# Patient Record
Sex: FEMALE | Race: WHITE | NOT HISPANIC OR LATINO | URBAN - METROPOLITAN AREA
[De-identification: names, ages, dates, MRNs, and addresses within clinical notes are randomized per-mention and may not be internally consistent; named-entity substitution may affect disease eponyms.]

---

## 2025-04-16 ASSESSMENT — DUKE ACTIVITY SCORE INDEX (DASI)
TOTAL_SCORE: 32.2
CAN YOU WALK A BLOCK OR TWO ON LEVEL GROUND: YES
CAN YOU TAKE CARE OF YOURSELF (EAT, DRESS, BATHE, OR USE TOILET): YES
CAN YOU DO HEAVY WORK AROUND THE HOUSE LIKE SCRUBBING FLOORS OR LIFTING AND MOVING HEAVY FURNITURE: YES
CAN YOU DO YARD WORK LIKE RAKING LEAVES, WEEDING OR PUSHING A MOWER: NO
CAN YOU DO LIGHT WORK AROUND THE HOUSE LIKE DUSTING OR WASHING DISHES: YES
CAN YOU WALK INDOORS, SUCH AS AROUND YOUR HOUSE: YES
CAN YOU DO MODERATE WORK AROUND THE HOUSE LIKE VACUUMING, SWEEPING FLOORS OR CARRYING GROCERIES: YES
CAN YOU CLIMB A FLIGHT OF STAIRS OR WALK UP A HILL: YES
DASI METS SCORE: 6.7
CAN YOU RUN A SHORT DISTANCE: NO
CAN YOU PARTICIPATE IN STRENOUS SPORTS LIKE SWIMMING, SINGLES TENNIS, FOOTBALL, BASKETBALL, OR SKIING: NO
CAN YOU PARTICIPATE IN MODERATE RECREATIONAL ACTIVITIES LIKE GOLF, BOWLING, DANCING, DOUBLES TENNIS OR THROWING A BASEBALL OR FOOTBALL: NO
CAN YOU HAVE SEXUAL RELATIONS: YES

## 2025-04-16 ASSESSMENT — LIFESTYLE VARIABLES: SMOKING_STATUS: NONSMOKER

## 2025-04-16 ASSESSMENT — ACTIVITIES OF DAILY LIVING (ADL): ADL_SCORE: 0

## 2025-04-17 ENCOUNTER — PRE-ADMISSION TESTING (OUTPATIENT)
Dept: PREADMISSION TESTING | Facility: HOSPITAL | Age: 70
End: 2025-04-17
Payer: MEDICARE

## 2025-04-17 ENCOUNTER — LAB (OUTPATIENT)
Dept: LAB | Facility: HOSPITAL | Age: 70
End: 2025-04-17
Payer: MEDICARE

## 2025-04-17 VITALS
BODY MASS INDEX: 30.47 KG/M2 | WEIGHT: 189.6 LBS | SYSTOLIC BLOOD PRESSURE: 146 MMHG | DIASTOLIC BLOOD PRESSURE: 82 MMHG | HEART RATE: 85 BPM | TEMPERATURE: 96.6 F | RESPIRATION RATE: 16 BRPM | HEIGHT: 66 IN | OXYGEN SATURATION: 99 %

## 2025-04-17 DIAGNOSIS — R79.9 ABNORMAL FINDING OF BLOOD CHEMISTRY, UNSPECIFIED: ICD-10-CM

## 2025-04-17 DIAGNOSIS — R79.9 ABNORMAL FINDING OF BLOOD CHEMISTRY, UNSPECIFIED: Primary | ICD-10-CM

## 2025-04-17 DIAGNOSIS — Z01.818 PRE-OP TESTING: ICD-10-CM

## 2025-04-17 DIAGNOSIS — Z01.818 PREOP EXAMINATION: Primary | ICD-10-CM

## 2025-04-17 LAB
ABO GROUP (TYPE) IN BLOOD: NORMAL
ANION GAP SERPL CALC-SCNC: 13 MMOL/L (ref 10–20)
ANTIBODY SCREEN: NORMAL
ATRIAL RATE: 76 BPM
BUN SERPL-MCNC: 17 MG/DL (ref 6–23)
CALCIUM SERPL-MCNC: 9.7 MG/DL (ref 8.6–10.3)
CHLORIDE SERPL-SCNC: 106 MMOL/L (ref 98–107)
CO2 SERPL-SCNC: 27 MMOL/L (ref 21–32)
CREAT SERPL-MCNC: 1.16 MG/DL (ref 0.5–1.05)
EGFRCR SERPLBLD CKD-EPI 2021: 51 ML/MIN/1.73M*2
ERYTHROCYTE [DISTWIDTH] IN BLOOD BY AUTOMATED COUNT: 12.8 % (ref 11.5–14.5)
GLUCOSE SERPL-MCNC: 99 MG/DL (ref 74–99)
HCT VFR BLD AUTO: 40.5 % (ref 36–46)
HGB BLD-MCNC: 13.3 G/DL (ref 12–16)
MCH RBC QN AUTO: 31.8 PG (ref 26–34)
MCHC RBC AUTO-ENTMCNC: 32.8 G/DL (ref 32–36)
MCV RBC AUTO: 97 FL (ref 80–100)
NRBC BLD-RTO: 0 /100 WBCS (ref 0–0)
P AXIS: 63 DEGREES
P OFFSET: 190 MS
P ONSET: 139 MS
PLATELET # BLD AUTO: 225 X10*3/UL (ref 150–450)
POTASSIUM SERPL-SCNC: 4.5 MMOL/L (ref 3.5–5.3)
PR INTERVAL: 146 MS
Q ONSET: 212 MS
QRS COUNT: 13 BEATS
QRS DURATION: 84 MS
QT INTERVAL: 392 MS
QTC CALCULATION(BAZETT): 441 MS
QTC FREDERICIA: 424 MS
R AXIS: 49 DEGREES
RBC # BLD AUTO: 4.18 X10*6/UL (ref 4–5.2)
RH FACTOR (ANTIGEN D): NORMAL
SODIUM SERPL-SCNC: 141 MMOL/L (ref 136–145)
T AXIS: 51 DEGREES
T OFFSET: 408 MS
VENTRICULAR RATE: 76 BPM
WBC # BLD AUTO: 5.3 X10*3/UL (ref 4.4–11.3)

## 2025-04-17 PROCEDURE — 83036 HEMOGLOBIN GLYCOSYLATED A1C: CPT

## 2025-04-17 PROCEDURE — 80048 BASIC METABOLIC PNL TOTAL CA: CPT

## 2025-04-17 PROCEDURE — 85027 COMPLETE CBC AUTOMATED: CPT

## 2025-04-17 PROCEDURE — 86901 BLOOD TYPING SEROLOGIC RH(D): CPT

## 2025-04-17 PROCEDURE — 86900 BLOOD TYPING SEROLOGIC ABO: CPT

## 2025-04-17 PROCEDURE — 99202 OFFICE O/P NEW SF 15 MIN: CPT | Performed by: NURSE PRACTITIONER

## 2025-04-17 PROCEDURE — 86850 RBC ANTIBODY SCREEN: CPT

## 2025-04-17 PROCEDURE — 93005 ELECTROCARDIOGRAM TRACING: CPT

## 2025-04-17 PROCEDURE — 87081 CULTURE SCREEN ONLY: CPT | Mod: STJLAB

## 2025-04-17 RX ORDER — LISINOPRIL 10 MG/1
10 TABLET ORAL DAILY
COMMUNITY

## 2025-04-17 RX ORDER — LANOLIN ALCOHOL/MO/W.PET/CERES
1000 CREAM (GRAM) TOPICAL
COMMUNITY

## 2025-04-17 RX ORDER — LEVOTHYROXINE SODIUM 50 UG/1
50 TABLET ORAL DAILY
COMMUNITY

## 2025-04-17 RX ORDER — CHLORHEXIDINE GLUCONATE ORAL RINSE 1.2 MG/ML
SOLUTION DENTAL
Qty: 473 ML | Refills: 0 | Status: SHIPPED | OUTPATIENT
Start: 2025-04-17

## 2025-04-17 RX ORDER — FERROUS SULFATE 325(65) MG
325 TABLET, DELAYED RELEASE (ENTERIC COATED) ORAL 2 TIMES WEEKLY
COMMUNITY

## 2025-04-17 ASSESSMENT — PAIN - FUNCTIONAL ASSESSMENT: PAIN_FUNCTIONAL_ASSESSMENT: 0-10

## 2025-04-17 NOTE — PREPROCEDURE INSTRUCTIONS
Thank you for visiting [facility] at [facility]. If you have any changes to your health condition, please call the SURGEON's office to alert them and give them details of your symptoms.        Preoperative Brain Exercises    What are brain exercises?  A brain exercise is any activity that engages your thinking (cognitive) skills.    What types of activities are considered brain exercises?  Jigsaw puzzles, crossword puzzles, word jumble, memory games, word search, and many more.  Many can be found free online or on your phone via a mobile bryce.    Why should I do brain exercises before my surgery?  More recent research has shown brain exercise before surgery can lower the risk of postoperative delirium (confusion) which can be especially important for older adults.  Patients who did brain exercises for 5 to 10 hours the days before surgery, cut their risk of postoperative delirium in half up to 1 week after surgery.      Preoperative Deep Breathing Exercises    Why it is important to do deep breathing exercises before my surgery?  Deep breathing exercises strengthen your breathing muscles.  This helps you to recover after your surgery and decreases the chance of breathing complications.    How are the deep breathing exercises done?  Sit straight with your back supported.  Breathe in deeply and slowly through your nose. Your lower rib cage should expand and your abdomen may move forward.  Hold that breath for 3 to 5 seconds.  Breathe out through pursed lips, slowly and completely.  Rest and repeat 10 times every hour while awake.  Rest longer if you become dizzy or lightheaded.      Patient and Family Education   Ways You Can Help Prevent Blood Clots     This handout explains some simple things you can do to help prevent blood clots.      Blood clots are blockages that can form in the body's veins. When a blood clot forms in your deep veins, it may be called a deep vein thrombosis, or DVT for short. Blood clots can  happen in any part of the body where blood flows, but they are most common in the arms and legs. If a piece of a blood clot breaks free and travels to the lungs, it is called a pulmonary embolus (PE). A PE can be a very serious problem.      Being in the hospital or having surgery can raise your chances of getting a blood clot because you may not be well enough to move around as much as you normally do.      Ways you can help prevent blood clots in the hospital         Wearing SCDs. SCDs stands for Sequential Compression Devices.   SCDs are special sleeves that wrap around your legs  They attach to a pump that fills them with air to gently squeeze your legs every few minutes.   This helps return the blood in your legs to your heart.   SCDs should only be taken off when walking or bathing.   SCDs may not be comfortable, but they can help save your life.               Wearing compression stockings - if your doctor orders them. These special snug fitting stockings gently squeeze your legs to help blood flow.       Walking. Walking helps move the blood in your legs.   If your doctor says it is ok, try walking the halls at least   5 times a day. Ask us to help you get up, so you don't fall.      Taking any blood thinning medicines your doctor orders.          ©[facility]; 3/23        Ways you can help prevent blood clots at home       Wearing compression stockings - if your doctor orders them. ? Walking - to help move the blood in your legs.       Taking any blood thinning medicines your doctor orders.      Signs of a blood clot or PE      Tell your doctor or nurse know right away if you have of the problems listed below.    If you are at home, seek medical care right away. Call 911 for chest pain or problems breathing.          Signs of a blood clot (DVT) - such as pain,  swelling, redness or warmth in your arm or leg      Signs of a pulmonary embolism (PE) - such as chest     pain or feeling short of breath  no

## 2025-04-17 NOTE — PREPROCEDURE INSTRUCTIONS
Medication List            Accurate as of April 17, 2025  1:30 PM. Always use your most recent med list.                chlorhexidine 0.12 % solution  Commonly known as: Peridex  Swish and spit 15 ml for 2 doses, 15mL the night before surgery and 15 ml morning of surgery - swish for 30 seconds -DO NOT SWALLOW, SPIT OUT  Medication Adjustments for Surgery: Take/Use as prescribed     cyanocobalamin 1,000 mcg tablet  Commonly known as: Vitamin B-12  Additional Medication Adjustments for Surgery: Take last dose 7 days before surgery  Notes to patient: STOP all NSAIDS (Ibuprofen, Motrin, Aleve), vitamins, herbals, supplements, and all over the counter medications for 7 days prior to surgery    Tylenol (Acetaminophen) is okay to take up until the morning of surgery for pain or headache as needed      ferrous sulfate 325 (65 Fe) mg EC tablet  Medication Adjustments for Surgery: Do Not take on the morning of surgery     levothyroxine 50 mcg tablet  Commonly known as: Synthroid, Levoxyl  Medication Adjustments for Surgery: Take/Use as prescribed     lisinopril 10 mg tablet  Additional Medication Adjustments for Surgery: Other (Comment)  Notes to patient: HOLD any evening dose the night before the day of surgery  HOLD the day of surgery    STOP all NSAIDS (Ibuprofen, Motrin, Aleve), vitamins, herbals, supplements, and all over the counter medications for 7 days prior to surgery    Tylenol (Acetaminophen) is okay to take up until the morning of surgery for pain or headache as needed                               PRE-OPERATIVE INSTRUCTIONS    You will receive notification one business day prior to your procedure to confirm your arrival time. It is important that you answer your phone and/or check your messages during this time. If you do not hear from the surgery center by 5 pm. the day before your procedure, please call [phone].     Please enter the building through the Outpatient entrance and take the elevator off the  lobby to the 2nd floor then check in at the Outpatient Surgery desk on the 2nd floor.    INSTRUCTIONS:  Talk to your surgeon for instructions if you should stop your aspirin, blood thinner, or diabetes medicines.  DO NOT take any multivitamins or over the counter supplements for 7-10 days before surgery.  If not being admitted, you must have an adult immediately available to drive you home after surgery. We also highly recommend you have someone stay with you for the entire day and night of your surgery.  For children having surgery, a parent or legal guardian must accompany them to the surgery center. If this is not possible, please call [phone] to make additional arrangements.  For adults who are unable to consent or make medical decisions for themselves, a legal guardian or Power of  must accompany them to the surgery center. If this is not possible, please call [phone] to make additional arrangements.  Wear comfortable, loose fitting clothing.  All jewelry and piercings must be removed. If you are unable to remove an item or have a dermal piercing, please be sure to tell the nurse when you arrive for surgery.  Nail polish and make-up must be removed.  Avoid smoking or consuming alcohol for 24 hours before surgery.  To help prevent infection, please take a shower/bath and wash your hair the night before and/or morning of surgery (or follow other specific bathing instructions provided).    Preoperative Fasting Guidelines    Why must I stop eating and drinking near surgery time?  With sedation, food or liquid in your stomach can enter your lungs causing serious complications  Increases nausea and vomiting    When do I need to stop eating and drinking before my surgery?  Do not eat any solid food after midnight the night before your surgery/procedure unless otherwise instructed by your surgeon.   If you take a GLP-1 medication (ex: Trulicity, Ozempic, Mounjaro, Zepbound, Bydyreon, Tanzeun, Saxenda,  Victoza, Adlyxin, Rybelus) please follow other specific instructions provided regarding preoperative          fasting.  You may have up to 13.5 ounces of clear liquid until TWO hours before your instructed arrival time to the hospital.  This includes water, black tea/coffee, (no milk or cream) apple juice, and electrolyte drinks (Gatorade).   You may chew gum until TWO hours before your surgery/procedure         If applicable, notify your surgeons office immediately of any new skin changes that occur to the surgical limb.      If you have any questions or concerns, please call Pre-Admission Testing at [phone].     Home Preoperative Antibacterial Shower with Chlorhexidine gluconate (CHG)     What is a home preoperative antibacterial shower?  This shower is a way of cleaning the skin with a germ killing solution before surgery. The solution contains chlorhexidine gluconate, commonly known as CHG. CHG is a skin cleanser with germ killing ability. Let your doctor know if you are allergic to chlorhexidine.    Why do I need to take a preoperative antibacterial shower?  Skin is not sterile. It is best to try to make your skin as free of germs as possible before surgery. Proper cleansing with a germ killing soap before surgery can lower the number of germs on your skin. This helps to reduce the risk of infection at the surgical site. Following the instructions listed below will help you prepare your skin for surgery.    How do I use the solution?  Begin using your CHG soap the night before and again the morning of your procedure.   Do not shave the day before or day of surgery.  Remove all jewelry until after surgery. Take off rings and take out all body-piercing jewelry.  Wash your face and hair with normal soap and shampoo before you use the CHG soap.  Apply the CHG solution to a clean wet washcloth. Move away from the water to avoid premature rinsing of the CHG soap as you are applying. Firmly lather your entire  body from the neck down. Do not use CHG on your face, eyes, ears, or genitals.   Pay special attention to the area where your incisions will be located.  Do not scrub your skin too hard.  It is important to allow the CHG soap to sit on your skin for 3-5 minutes.  Rinse the solution off your body completely. Do not wash with your normal soap after the CHG soap solution.  Pat yourself dry with a clean, soft towel.  Do not apply powders, lotions or deodorants as these might block how the CHG soap works.   Dress in clean clothing.  Be sure to sleep with clean, freshly laundered sheets.  Be aware that CHG can cause stains on fabric. Rinse your washcloth and other linens that have contact with CHG completely. Use only non-chlorine detergents to launder the items used.

## 2025-04-17 NOTE — PREPROCEDURE INSTRUCTIONS
Medication List            Accurate as of April 17, 2025  1:42 PM. Always use your most recent med list.                chlorhexidine 0.12 % solution  Commonly known as: Peridex  Swish and spit 15 ml for 2 doses, 15mL the night before surgery and 15 ml morning of surgery - swish for 30 seconds -DO NOT SWALLOW, SPIT OUT  Medication Adjustments for Surgery: Take/Use as prescribed     cyanocobalamin 1,000 mcg tablet  Commonly known as: Vitamin B-12  Additional Medication Adjustments for Surgery: Take last dose 7 days before surgery  Notes to patient: STOP all NSAIDS (Ibuprofen, Motrin, Aleve), vitamins, herbals, supplements, and all over the counter medications for 7 days prior to surgery    Tylenol (Acetaminophen) is okay to take up until the morning of surgery for pain or headache as needed      ferrous sulfate 325 (65 Fe) mg EC tablet  Medication Adjustments for Surgery: Do Not take on the morning of surgery     levothyroxine 50 mcg tablet  Commonly known as: Synthroid, Levoxyl  Medication Adjustments for Surgery: Take/Use as prescribed     lisinopril 10 mg tablet  Additional Medication Adjustments for Surgery: Other (Comment)  Notes to patient: HOLD any evening dose the night before the day of surgery  HOLD the day of surgery    STOP all NSAIDS (Ibuprofen, Motrin, Aleve), vitamins, herbals, supplements, and all over the counter medications for 7 days prior to surgery    Tylenol (Acetaminophen) is okay to take up until the morning of surgery for pain or headache as needed                               PRE-OPERATIVE INSTRUCTIONS    You will receive notification one business day prior to your procedure to confirm your arrival time. It is important that you answer your phone and/or check your messages during this time. If you do not hear from the surgery center by 5 pm. the day before your procedure, please call [phone].     Please enter the building through the Outpatient entrance and take the elevator off the  lobby to the 2nd floor then check in at the Outpatient Surgery desk on the 2nd floor.    INSTRUCTIONS:  Talk to your surgeon for instructions if you should stop your aspirin, blood thinner, or diabetes medicines.  DO NOT take any multivitamins or over the counter supplements for 7-10 days before surgery.  If not being admitted, you must have an adult immediately available to drive you home after surgery. We also highly recommend you have someone stay with you for the entire day and night of your surgery.  For children having surgery, a parent or legal guardian must accompany them to the surgery center. If this is not possible, please call [phone] to make additional arrangements.  For adults who are unable to consent or make medical decisions for themselves, a legal guardian or Power of  must accompany them to the surgery center. If this is not possible, please call [phone] to make additional arrangements.  Wear comfortable, loose fitting clothing.  All jewelry and piercings must be removed. If you are unable to remove an item or have a dermal piercing, please be sure to tell the nurse when you arrive for surgery.  Nail polish and make-up must be removed.  Avoid smoking or consuming alcohol for 24 hours before surgery.  To help prevent infection, please take a shower/bath and wash your hair the night before and/or morning of surgery (or follow other specific bathing instructions provided).    Preoperative Fasting Guidelines    Why must I stop eating and drinking near surgery time?  With sedation, food or liquid in your stomach can enter your lungs causing serious complications  Increases nausea and vomiting    When do I need to stop eating and drinking before my surgery?  Do not eat any solid food after midnight the night before your surgery/procedure unless otherwise instructed by your surgeon.   If you take a GLP-1 medication (ex: Trulicity, Ozempic, Mounjaro, Zepbound, Bydyreon, Tanzeun, Saxenda,  Victoza, Adlyxin, Jostin) please follow other specific instructions provided regarding preoperative          fasting.  You may have up to 13.5 ounces of clear liquid until TWO hours before your instructed arrival time to the hospital.  This includes water, black tea/coffee, (no milk or cream) apple juice, and electrolyte drinks (Gatorade).   You may chew gum until TWO hours before your surgery/procedure         If applicable, notify your surgeons office immediately of any new skin changes that occur to the surgical limb.      If you have any questions or concerns, please call Pre-Admission Testing at [phone].

## 2025-04-17 NOTE — CPM/PAT H&P
[facility]/PAT Evaluation       Name: Chely Prasad (Chely Prasad)  /Age: 1955/69 y.o.     In-Person       Chief Complaint: pre op appointment for upcoming left THR surgery    HPI    KS is a 70 yo female who has been having left hip discomforts for over one year now and was following with PCP then referred to rheumatology for management. She has received steroids with some improvement, but continues to have symptoms. She was referred to orthopedics and imaging with MRI showed left hip OA. Treatment options discussed and subsequently she is scheduled for a left total hip replacement surgery. Presents to [facility] today for perioperative risk stratification and optimization.  Skin is intact to surgical limb.  Denies any falls with use of assistive devices.  She does wear left ankle brace due to a previous injury. Otherwise denies any recent illness, fever/chills, chest pains or shortness of breath.     Medical History[1]    Surgical History[2]    Patient  has no history on file for sexual activity.    Family History[3]    Allergies[4]    Prior to Admission medications    Medication Sig Start Date End Date Taking? Authorizing Provider   chlorhexidine (Peridex) 0.12 % solution Swish and spit 15 ml for 2 doses, 15mL the night before surgery and 15 ml morning of surgery - swish for 30 seconds -DO NOT SWALLOW, SPIT OUT 25   ANA Babb-CNP   levothyroxine (Synthroid, Levoxyl) 50 mcg tablet Take 1 tablet (50 mcg) by mouth early in the morning..    Historical Provider, MD   lisinopril 10 mg tablet Take 1 tablet (10 mg) by mouth once daily.    Historical Provider, MD BRYCE DONALD   Constitutional: Negative for fever, chills, or sweats   ENMT: Negative for nasal discharge, congestion, ear pain, mouth pain, throat pain + eyeglasses  Respiratory: Negative for cough, wheezing, shortness of breath   Cardiac: Negative for chest pain, dyspnea on exertion, palpitations   Gastrointestinal: Negative for nausea,  "vomiting, diarrhea, constipation, abdominal pain  Genitourinary: Negative for dysuria, flank pain, frequency, hematuria     Musculoskeletal: Positive for decreased ROM, pain, weakness in left hip    Neurological: Negative for dizziness, confusion, headache  Psychiatric: Negative for mood changes   Skin: Negative for itching, rash, ulcer    Hematologic/Lymph: Negative for bruising, easy bleeding  Allergic/Immunologic: Negative itching, sneezing, swelling      Physical Exam  Constitutional:       Appearance: Normal appearance.   HENT:      Head: Normocephalic.      Mouth/Throat:      Mouth: Mucous membranes are moist.   Eyes:      Extraocular Movements: Extraocular movements intact.   Cardiovascular:      Rate and Rhythm: Normal rate and regular rhythm.   Pulmonary:      Effort: Pulmonary effort is normal.      Breath sounds: Normal breath sounds.   Abdominal:      General: Abdomen is flat.      Palpations: Abdomen is soft.   Musculoskeletal:      Cervical back: Normal range of motion.      Left hip: Decreased range of motion.   Skin:     General: Skin is warm and dry.   Neurological:      General: No focal deficit present.      Mental Status: She is alert.   Psychiatric:         Mood and Affect: Mood normal.        PAT AIRWAY:   Airway:     Mallampati::  II    Neck ROM::  Full  normal      Patient Specialist/PCP: Dr. ROXI Mcfarland  Rheum: Dr. Bardales    Visit Vitals  /82   Pulse 85   Temp 35.9 °C (96.6 °F) (Temporal)   Resp 16   Ht 1.676 m (5' 6\")   Wt 86 kg (189 lb 9.5 oz)   SpO2 99%   BMI 30.60 kg/m²   Smoking Status Never   BSA 2 m²       DASI Risk Score      Flowsheet Row Pre-Admission Testing from 4/17/2025 in [facility]   Can you take care of yourself (eat, dress, bathe, or use toilet)?  2.75 filed at 04/16/2025 1325   Can you walk indoors, such as around your house? 1.75 filed at 04/16/2025 1325   Can you walk a block or two on level ground?  2.75 filed at 04/16/2025 1325   Can you climb a " flight of stairs or walk up a hill? 5.5 filed at 04/16/2025 1325   Can you run a short distance? 0 filed at 04/16/2025 1325   Can you do light work around the house like dusting or washing dishes? 2.7 filed at 04/16/2025 1325   Can you do moderate work around the house like vacuuming, sweeping floors or carrying groceries? 3.5 filed at 04/16/2025 1325   Can you do heavy work around the house like scrubbing floors or lifting and moving heavy furniture?  8 filed at 04/16/2025 1325   Can you do yard work like raking leaves, weeding or pushing a mower? 0 filed at 04/16/2025 1325   Can you have sexual relations? 5.25 filed at 04/16/2025 1325   Can you participate in moderate recreational activities like golf, bowling, dancing, doubles tennis or throwing a baseball or football? 0 filed at 04/16/2025 1325   Can you participate in strenous sports like swimming, singles tennis, football, basketball, or skiing? 0 filed at 04/16/2025 1325   DASI SCORE 32.2 filed at 04/16/2025 1325   METS Score (Will be calculated only when all the questions are answered) 6.7 filed at 04/16/2025 1325          Caprini DVT Assessment      Flowsheet Row Pre-Admission Testing from 4/17/2025 in [facility]   DVT Score (IF A SCORE IS NOT CALCULATING, MUST SELECT A BMI TO COMPLETE) 8 filed at 04/16/2025 1331   Surgical Factors Elective major lower extremity arthroplasty filed at 04/16/2025 1331   BMI (BMI MUST BE CHOSEN) 30 or less filed at 04/16/2025 1331          Modified Frailty Index      Flowsheet Row Pre-Admission Testing from 4/17/2025 in [facility]   Non-independent functional status (problems with dressing, bathing, personal grooming, or cooking) 0 filed at 04/16/2025 1325   History of diabetes mellitus  0 filed at 04/16/2025 1325   History of COPD 0 filed at 04/16/2025 1325   History of CHF No filed at 04/16/2025 1325   History of MI 0 filed at 04/16/2025 1325   History of Percutaneous Coronary Intervention,  Cardiac Surgery, or Angina No filed at 04/16/2025 1325   Hypertension requiring the use of medication  0.0909 filed at 04/16/2025 1325   Peripheral vascular disease 0 filed at 04/16/2025 1325   Impaired sensorium (cognitive impairement or loss, clouding, or delirium) 0 filed at 04/16/2025 1325   TIA or CVA withouy residual deficit 0 filed at 04/16/2025 1325   Cerebrovascular accident with deficit 0 filed at 04/16/2025 1325   Modified Frailty Index Calculator .0909 filed at 04/16/2025 1325          [id number] Stroke Risk Points  Current as of just now        N/A 0 to 9 Points:      Last Change: N/A          The [id number] risk score (Lip KAN, et al. 2009. © 2010 [employer]) quantifies the risk of stroke for a patient with atrial fibrillation. For patients without atrial fibrillation or under the age of 18 this score appears as N/A. Higher score values generally indicate higher risk of stroke.        This score is not applicable to this patient. Components are not calculated.          Revised Cardiac Risk Index      Flowsheet Row Pre-Admission Testing from 4/17/2025 in [facility]   High-Risk Surgery (Intraperitoneal, Intrathoracic,Suprainguinal vascular) 0 filed at 04/16/2025 1325   History of ischemic heart disease (History of MI, History of positive exercuse test, Current chest paint considered due to myocardial ischemia, Use of nitrate therapy, ECG with pathological Q Waves) 0 filed at 04/16/2025 1325   History of congestive heart failure (pulmonary edemia, bilateral rales or S3 gallop, Paroxysmal nocturnal dyspnea, CXR showing pulmonary vascular redistribution) 0 filed at 04/16/2025 1325   History of cerebrovascular disease (Prior TIA or stroke) 0 filed at 04/16/2025 1325   Pre-operative insulin treatment 0 filed at 04/16/2025 1325   Pre-operative creatinine>2 mg/dl 0 filed at 04/16/2025 1325   Revised Cardiac Risk Calculator 0 filed at 04/16/2025 1325          Apfel  Simplified Score      Flowsheet Row Pre-Admission Testing from 2025 in [facility]   Smoking status 1 filed at 2025 1325   History of motion sickness or PONV  0 filed at 2025 1325   Use of postoperative opioids 1 filed at 2025 1325   Gender - Female 1=Yes filed at 2025 1325   Apfel Simplified Score Calculator 3 filed at 2025 1325          Risk Analysis Index Results This Encounter         2025  1325             Do you live in a place other than your own home?: 0    When did you begin living in the place you are currently residing?: Greater than one year ago    Any kidney failure, kidney not working well, or seeing a kidney doctor (nephrologist)? If yes, was this for kidney stones or another problem?: 0 No    Any history of chronic (long-term) congestive heart failure (CHF)?: 0 No    Any shortness of breath when resting?: 0 No    In the past five years, have you been diagnosed with or treated for cancer?: No    During the last 3 months has it become difficult for you to remember things or organize your thoughts?: 0 No    Have you lost weight of 10 pounds or more in the past 3 months without trying?: 0 No    Do you have any loss of appetitie?: 0 No    Getting Around (Mobility): 0 Can get around without help    Eatin Can plan and prepare own meals    Toiletin Can use toilet without any help    Personal Hygiene (Bathing, Hand Washing, Changing Clothes): 0 Can shower or bathe without any help    GOODMAN Cancer History: Patient does not indicate history of cancer    Total Risk Analysis Index Score Without Cancer: 20    Total Risk Analysis Index Score: 20          Stop Bang Score      Flowsheet Row Pre-Admission Testing from 2025 in [facility]   Do you snore loudly? 1 filed at 2025 1324   Do you often feel tired or fatigued after your sleep? 0 filed at 2025 1324   Has anyone ever observed you stop breathing in your sleep? 0 filed at  04/16/2025 1324   Do you have or are you being treated for high blood pressure? 1 filed at 04/16/2025 1324   Recent BMI (Calculated) 31 filed at 04/16/2025 1324   Is BMI greater than 35 kg/m2? 0=No filed at 04/16/2025 1324   Age older than 50 years old? 1=Yes filed at 04/16/2025 1324   Is your neck circumference greater than 17 inches (Male) or 16 inches (Female)? 0 filed at 04/16/2025 1324   Gender - Male 0=No filed at 04/16/2025 1324   STOP-BANG Total Score 3 filed at 04/16/2025 1324          Prodigy: High Risk  Total Score: 8              Prodigy Age Score           ARISCAT Score for Postoperative Pulmonary Complications      Flowsheet Row Pre-Admission Testing from 4/17/2025 in [facility]   Age Calculated Score 3 filed at 04/16/2025 1331   Preoperative SpO2 0 filed at 04/16/2025 1331   Respiratory infection in the last month Either upper or lower (i.e., URI, bronchitis, pneumonia), with fever and antibiotic treatment 0 filed at 04/16/2025 1331   Preoperative anemia (Hgb less than 10 g/dl) 0 filed at 04/16/2025 1331   Surgical incision  0 filed at 04/16/2025 1331   Duration of surgery  0 filed at 04/16/2025 1331   Emergency Procedure  0 filed at 04/16/2025 1331   ARISCAT Total Score  3 filed at 04/16/2025 1331          Kevin Perioperative Risk for Myocardial Infarction or Cardiac Arrest (TEO)      Flowsheet Row Pre-Admission Testing from 4/17/2025 in [facility]   Calculated Age Score 1.38 filed at 04/16/2025 1331   Functional Status  0 filed at 04/16/2025 1331   ASA Class  -5.17 filed at 04/16/2025 1331   Creatinine 0 filed at 04/16/2025 1331   Type of Procedure  0.80 filed at 04/16/2025 1331   TEO Total Score  -8.24 filed at 04/16/2025 1331   TEO % 0.03 filed at 04/16/2025 1331            Assessment and Plan:     Pre-Op  70 yo female scheduled for ARTHROPLASTY, HIP, TOTAL, WITHOUT CEMENT - Left on 4/28/2025 with Dr. Trevino. Blood work and MRSA ordered- oral chlorahexidine  prescribed.  EKG shows  Otherwise no further orders indicated.     Cardiac  Hypertension, taking lisinopril   Hyperlipidemia  DASI Score: 32.2   MET Score: 6.7  RCRI  0 which is 3.9% 30 day risk of MACE (risk for cardiac death, nonfatal myocardial infarction, and nonfactal cardiac arrest)  TEO score which indicates a  0.03 % risk of intraoperative or 30-day postoperative MACE    Pulmonary  No diagnosis or significant findings on chart review or clinical presentation and evaluation.    Preoperative deep breathing educational handout provided to patient.  STOP BANG:   3  points which is a low risk for moderate to severe STEPHY  ARISCAT:  3    points which is a low (1.6%) risk of in-hospital post-op pulmonary complications     Neuro  No neurologic diagnoses, however, the patient is at an increased risk for post operative delirium secondary to age >/= 65, sensory impairment, and type and duration of surgery.  Preoperative brain exercise educational handout provided to patient.    The patient is at an increased risk for perioperative stroke secondary to increased age, HTN, HLD, female sex , and general anesthesia.     Endocrine  Hypothyroidism, taking Synthroid    GI  Apfel: 3 points 61% risk for post operative N/V    /Renal  Chronic kidney disease, stage 3  Counseled on avoiding NSAIDs, adequate hydration    Musculoskeletal   Osteoarthritis, left hip    Hematologic  Iron deficiency anemia, taking oral supplement    due to high Caprini score of 8 patient is at HIGH risk for perioperative DVT, informative education handout provided    Psychiatric    Skin check: Patient was instructed to make surgeon aware of any skin changes/concerns prior to surgery.     Anesthesia:  Patient denies any anesthesia complications.     See risk scores as previously documented.            [1]   Past Medical History:  Diagnosis Date    Arthritis     b/l hips and back    Chronic kidney disease     CKD (chronic kidney disease) 7/2023    Stage 3A     Disease of thyroid gland     Hyperlipidemia     Hypertension     Joint pain     Obesity     Pneumonia    [2]   Past Surgical History:  Procedure Laterality Date     SECTION, LOW TRANSVERSE  1992    MENISCECTOMY  2015   [3]   Family History  Problem Relation Name Age of Onset    Arthritis Mother Afshan Mcclellandkadentenzin     Cancer Father Sheldon Brandt    [4]   Allergies  Allergen Reactions    Ceclor [Cefaclor] Rash

## 2025-04-17 NOTE — H&P (VIEW-ONLY)
[facility]/PAT Evaluation       Name: Chely Prasad (Chely Prasad)  /Age: 1955/69 y.o.     In-Person       Chief Complaint: pre op appointment for upcoming left THR surgery    HPI    KS is a 68 yo female who has been having left hip discomforts for over one year now and was following with PCP then referred to rheumatology for management. She has received steroids with some improvement, but continues to have symptoms. She was referred to orthopedics and imaging with MRI showed left hip OA. Treatment options discussed and subsequently she is scheduled for a left total hip replacement surgery. Presents to [facility] today for perioperative risk stratification and optimization.  Skin is intact to surgical limb.  Denies any falls with use of assistive devices.  She does wear left ankle brace due to a previous injury. Otherwise denies any recent illness, fever/chills, chest pains or shortness of breath.     Medical History[1]    Surgical History[2]    Patient  has no history on file for sexual activity.    Family History[3]    Allergies[4]    Prior to Admission medications    Medication Sig Start Date End Date Taking? Authorizing Provider   chlorhexidine (Peridex) 0.12 % solution Swish and spit 15 ml for 2 doses, 15mL the night before surgery and 15 ml morning of surgery - swish for 30 seconds -DO NOT SWALLOW, SPIT OUT 25   ANA Babb-CNP   levothyroxine (Synthroid, Levoxyl) 50 mcg tablet Take 1 tablet (50 mcg) by mouth early in the morning..    Historical Provider, MD   lisinopril 10 mg tablet Take 1 tablet (10 mg) by mouth once daily.    Historical Provider, MD BRYCE DONALD   Constitutional: Negative for fever, chills, or sweats   ENMT: Negative for nasal discharge, congestion, ear pain, mouth pain, throat pain + eyeglasses  Respiratory: Negative for cough, wheezing, shortness of breath   Cardiac: Negative for chest pain, dyspnea on exertion, palpitations   Gastrointestinal: Negative for nausea,  "vomiting, diarrhea, constipation, abdominal pain  Genitourinary: Negative for dysuria, flank pain, frequency, hematuria     Musculoskeletal: Positive for decreased ROM, pain, weakness in left hip    Neurological: Negative for dizziness, confusion, headache  Psychiatric: Negative for mood changes   Skin: Negative for itching, rash, ulcer    Hematologic/Lymph: Negative for bruising, easy bleeding  Allergic/Immunologic: Negative itching, sneezing, swelling      Physical Exam  Constitutional:       Appearance: Normal appearance.   HENT:      Head: Normocephalic.      Mouth/Throat:      Mouth: Mucous membranes are moist.   Eyes:      Extraocular Movements: Extraocular movements intact.   Cardiovascular:      Rate and Rhythm: Normal rate and regular rhythm.   Pulmonary:      Effort: Pulmonary effort is normal.      Breath sounds: Normal breath sounds.   Abdominal:      General: Abdomen is flat.      Palpations: Abdomen is soft.   Musculoskeletal:      Cervical back: Normal range of motion.      Left hip: Decreased range of motion.   Skin:     General: Skin is warm and dry.   Neurological:      General: No focal deficit present.      Mental Status: She is alert.   Psychiatric:         Mood and Affect: Mood normal.        PAT AIRWAY:   Airway:     Mallampati::  II    Neck ROM::  Full  normal      Patient Specialist/PCP: Dr. ROXI Mcfarland  Rheum: Dr. Bardales    Visit Vitals  /82   Pulse 85   Temp 35.9 °C (96.6 °F) (Temporal)   Resp 16   Ht 1.676 m (5' 6\")   Wt 86 kg (189 lb 9.5 oz)   SpO2 99%   BMI 30.60 kg/m²   Smoking Status Never   BSA 2 m²       DASI Risk Score      Flowsheet Row [place] [facility] [place]   Can you take care of yourself (eat, dress, bathe, or use toilet)?  2.75 filed at 04/16/2025 1325   Can you walk indoors, such as around your house? 1.75 filed at 04/16/2025 1325   Can you walk a block or two on level ground?  2.75 filed at 04/16/2025 1325   Can you climb a " flight of stairs or walk up a hill? 5.5 filed at 04/16/2025 1325   Can you run a short distance? 0 filed at 04/16/2025 1325   Can you do light work around the house like dusting or washing dishes? 2.7 filed at 04/16/2025 1325   Can you do moderate work around the house like vacuuming, sweeping floors or carrying groceries? 3.5 filed at 04/16/2025 1325   Can you do heavy work around the house like scrubbing floors or lifting and moving heavy furniture?  8 filed at 04/16/2025 1325   Can you do yard work like raking leaves, weeding or pushing a mower? 0 filed at 04/16/2025 1325   Can you have sexual relations? 5.25 filed at 04/16/2025 1325   Can you participate in moderate recreational activities like golf, bowling, dancing, doubles tennis or throwing a baseball or football? 0 filed at 04/16/2025 1325   Can you participate in strenous sports like swimming, singles tennis, football, basketball, or skiing? 0 filed at 04/16/2025 1325   DASI SCORE 32.2 filed at 04/16/2025 1325   METS Score (Will be calculated only when all the questions are answered) 6.7 filed at 04/16/2025 1325          Caprini DVT Assessment      Flowsheet Row Pre-Admission Testing from 4/17/2025 in [facility] [place]   DVT Score (IF A SCORE IS NOT CALCULATING, MUST SELECT A BMI TO COMPLETE) 8 filed at 04/16/2025 1331   Surgical Factors Elective major lower extremity arthroplasty filed at 04/16/2025 1331   BMI (BMI MUST BE CHOSEN) 30 or less filed at 04/16/2025 1331          Modified Frailty Index      Flowsheet Row Pre-Admission Testing from 4/17/2025 in [facility] [place]   Non-independent functional status (problems with dressing, bathing, personal grooming, or cooking) 0 filed at 04/16/2025 1325   History of diabetes mellitus  0 filed at 04/16/2025 1325   History of COPD 0 filed at 04/16/2025 1325   History of CHF No filed at 04/16/2025 1325   History of MI 0 filed at 04/16/2025 1325   History of Percutaneous Coronary Intervention,  Cardiac Surgery, or Angina No filed at 04/16/2025 1325   Hypertension requiring the use of medication  0.0909 filed at 04/16/2025 1325   Peripheral vascular disease 0 filed at 04/16/2025 1325   Impaired sensorium (cognitive impairement or loss, clouding, or delirium) 0 filed at 04/16/2025 1325   TIA or CVA withouy residual deficit 0 filed at 04/16/2025 1325   Cerebrovascular accident with deficit 0 filed at 04/16/2025 1325   Modified Frailty Index Calculator .0909 filed at 04/16/2025 1325          [id number] Stroke Risk Points  Current as of just now        N/A 0 to 9 Points:      Last Change: N/A          The [id number] risk score (Lip KAN, et al. 2009. © 2010 [employer]) quantifies the risk of stroke for a patient with atrial fibrillation. For patients without atrial fibrillation or under the age of 18 this score appears as N/A. Higher score values generally indicate higher risk of stroke.        This score is not applicable to this patient. Components are not calculated.          Revised Cardiac Risk Index      Flowsheet Row Pre-Admission Testing from 4/17/2025 in [facility] [place]   High-Risk Surgery (Intraperitoneal, Intrathoracic,Suprainguinal vascular) 0 filed at 04/16/2025 1325   History of ischemic heart disease (History of MI, History of positive exercuse test, Current chest paint considered due to myocardial ischemia, Use of nitrate therapy, ECG with pathological Q Waves) 0 filed at 04/16/2025 1325   History of congestive heart failure (pulmonary edemia, bilateral rales or S3 gallop, Paroxysmal nocturnal dyspnea, CXR showing pulmonary vascular redistribution) 0 filed at 04/16/2025 1325   History of cerebrovascular disease (Prior TIA or stroke) 0 filed at 04/16/2025 1325   Pre-operative insulin treatment 0 filed at 04/16/2025 1325   Pre-operative creatinine>2 mg/dl 0 filed at 04/16/2025 1325   Revised Cardiac Risk Calculator 0 filed at 04/16/2025 1325          Apfel  Simplified Score      [place]sheet [place] [facility] [place]oking status 1 filed at 2025 1325   History of motion sickness or PONV  0 filed at 2025 1325   Use of postoperative opioids 1 filed at 2025 1325   Gender - Female 1=Yes filed at 2025 1325   Apfel Simplified Score Calculator 3 filed at 2025 1325          Risk Analysis Index Results This Encounter         2025  1325             Do you live in a place other than your own home?: 0    When did you begin living in the place you are currently residing?: Greater than one year ago    Any kidney failure, kidney not working well, or seeing a kidney doctor (nephrologist)? If yes, was this for kidney stones or another problem?: 0 No    Any history of chronic (long-term) congestive heart failure (CHF)?: 0 No    Any shortness of breath when resting?: 0 No    In the past five years, have you been diagnosed with or treated for cancer?: No    During the last 3 months has it become difficult for you to remember things or organize your thoughts?: 0 No    Have you lost weight of 10 pounds or more in the past 3 months without trying?: 0 No    Do you have any loss of appetitie?: 0 No    Getting Around (Mobility): 0 Can get around without help    Eatin Can plan and prepare own meals    Toiletin Can use toilet without any help    Personal Hygiene (Bathing, Hand Washing, Changing Clothes): 0 Can shower or bathe without any help    GOODMAN Cancer History: Patient does not indicate history of cancer    Total Risk Analysis Index Score Without Cancer: 20    Total Risk Analysis Index Score: 20          Stop Bang Score      Flowsheet Row [place] [facility] [place]   Do you snore loudly? 1 filed at 2025 1324   Do you often feel tired or fatigued after your sleep? 0 filed at 2025 1324   Has anyone ever observed you stop breathing in your sleep? 0 filed at  04/16/2025 1324   Do you have or are you being treated for high blood pressure? 1 filed at 04/16/2025 1324   Recent BMI (Calculated) 31 filed at 04/16/2025 1324   Is BMI greater than 35 kg/m2? 0=No filed at 04/16/2025 1324   Age older than 50 years old? 1=Yes filed at 04/16/2025 1324   Is your neck circumference greater than 17 inches (Male) or 16 inches (Female)? 0 filed at 04/16/2025 1324   Gender - Male 0=No filed at 04/16/2025 1324   STOP-BANG Total Score 3 filed at 04/16/2025 1324          Prodigy: High Risk  Total Score: 8              Prodigy Age Score           ARISCAT Score for Postoperative Pulmonary Complications      Flowsheet Row Pre-Admission Testing from 4/17/2025 in [facility]   Age Calculated Score 3 filed at 04/16/2025 1331   Preoperative SpO2 0 filed at 04/16/2025 1331   Respiratory infection in the last month Either upper or lower (i.e., URI, bronchitis, pneumonia), with fever and antibiotic treatment 0 filed at 04/16/2025 1331   Preoperative anemia (Hgb less than 10 g/dl) 0 filed at 04/16/2025 1331   Surgical incision  0 filed at 04/16/2025 1331   Duration of surgery  0 filed at 04/16/2025 1331   Emergency Procedure  0 filed at 04/16/2025 1331   ARISCAT Total Score  3 filed at 04/16/2025 1331          Kevin Perioperative Risk for Myocardial Infarction or Cardiac Arrest (TEO)      Flowsheet Row Pre-Admission Testing from 4/17/2025 in [facility]   Calculated Age Score 1.38 filed at 04/16/2025 1331   Functional Status  0 filed at 04/16/2025 1331   ASA Class  -5.17 filed at 04/16/2025 1331   Creatinine 0 filed at 04/16/2025 1331   Type of Procedure  0.80 filed at 04/16/2025 1331   TEO Total Score  -8.24 filed at 04/16/2025 1331   TEO % 0.03 filed at 04/16/2025 1331            Assessment and Plan:     Pre-Op  70 yo female scheduled for ARTHROPLASTY, HIP, TOTAL, WITHOUT CEMENT - Left on 4/28/2025 with Dr. Trevino. Blood work and MRSA ordered- oral chlorahexidine  prescribed.  EKG shows  Otherwise no further orders indicated.     Cardiac  Hypertension, taking lisinopril   Hyperlipidemia  DASI Score: 32.2   MET Score: 6.7  RCRI  0 which is 3.9% 30 day risk of MACE (risk for cardiac death, nonfatal myocardial infarction, and nonfactal cardiac arrest)  TEO score which indicates a  0.03 % risk of intraoperative or 30-day postoperative MACE    Pulmonary  No diagnosis or significant findings on chart review or clinical presentation and evaluation.    Preoperative deep breathing educational handout provided to patient.  STOP BANG:   3  points which is a low risk for moderate to severe STEPHY  ARISCAT:  3    points which is a low (1.6%) risk of in-hospital post-op pulmonary complications     Neuro  No neurologic diagnoses, however, the patient is at an increased risk for post operative delirium secondary to age >/= 65, sensory impairment, and type and duration of surgery.  Preoperative brain exercise educational handout provided to patient.    The patient is at an increased risk for perioperative stroke secondary to increased age, HTN, HLD, female sex , and general anesthesia.     Endocrine  Hypothyroidism, taking Synthroid    GI  Apfel: 3 points 61% risk for post operative N/V    /Renal  Chronic kidney disease, stage 3  Counseled on avoiding NSAIDs, adequate hydration    Musculoskeletal   Osteoarthritis, left hip    Hematologic  Iron deficiency anemia, taking oral supplement    due to high Caprini score of 8 patient is at HIGH risk for perioperative DVT, informative education handout provided    Psychiatric    Skin check: Patient was instructed to make surgeon aware of any skin changes/concerns prior to surgery.     Anesthesia:  Patient denies any anesthesia complications.     See risk scores as previously documented.            [1]   Past Medical History:  Diagnosis Date    Arthritis     b/l hips and back    Chronic kidney disease     CKD (chronic kidney disease) 7/2023    Stage 3A     Disease of thyroid gland     Hyperlipidemia     Hypertension     Joint pain     Obesity     Pneumonia    [2]   Past Surgical History:  Procedure Laterality Date     SECTION, LOW TRANSVERSE  1992    MENISCECTOMY  2015   [3]   Family History  Problem Relation Name Age of Onset    Arthritis Mother Afshan Mcclellandkadentenzin     Cancer Father Sheldon Brandt    [4]   Allergies  Allergen Reactions    Ceclor [Cefaclor] Rash

## 2025-04-18 LAB
EST. AVERAGE GLUCOSE BLD GHB EST-MCNC: 111 MG/DL
HBA1C MFR BLD: 5.5 % (ref ?–5.7)

## 2025-04-20 LAB — STAPHYLOCOCCUS SPEC CULT: ABNORMAL

## 2025-04-28 ENCOUNTER — ANESTHESIA (OUTPATIENT)
Dept: OPERATING ROOM | Facility: HOSPITAL | Age: 70
End: 2025-04-28
Payer: MEDICARE

## 2025-04-28 ENCOUNTER — HOSPITAL ENCOUNTER (OUTPATIENT)
Facility: HOSPITAL | Age: 70
Discharge: HOME HEALTH | End: 2025-04-29
Attending: ORTHOPAEDIC SURGERY | Admitting: ORTHOPAEDIC SURGERY
Payer: MEDICARE

## 2025-04-28 ENCOUNTER — APPOINTMENT (OUTPATIENT)
Dept: RADIOLOGY | Facility: HOSPITAL | Age: 70
End: 2025-04-28
Payer: MEDICARE

## 2025-04-28 ENCOUNTER — ANESTHESIA EVENT (OUTPATIENT)
Dept: OPERATING ROOM | Facility: HOSPITAL | Age: 70
End: 2025-04-28
Payer: MEDICARE

## 2025-04-28 DIAGNOSIS — Z96.642 STATUS POST HIP REPLACEMENT, LEFT: Primary | ICD-10-CM

## 2025-04-28 PROBLEM — M16.12 OSTEOARTHRITIS OF LEFT HIP, UNSPECIFIED OSTEOARTHRITIS TYPE: Status: ACTIVE | Noted: 2025-04-28

## 2025-04-28 PROBLEM — E03.9 HYPOTHYROIDISM: Status: ACTIVE | Noted: 2025-04-28

## 2025-04-28 PROBLEM — I10 PRIMARY HYPERTENSION: Status: ACTIVE | Noted: 2025-04-28

## 2025-04-28 PROCEDURE — 7100000002 HC RECOVERY ROOM TIME - EACH INCREMENTAL 1 MINUTE: Performed by: ORTHOPAEDIC SURGERY

## 2025-04-28 PROCEDURE — A27130 PR TOTAL HIP ARTHROPLASTY: Performed by: STUDENT IN AN ORGANIZED HEALTH CARE EDUCATION/TRAINING PROGRAM

## 2025-04-28 PROCEDURE — 2780000003 HC OR 278 NO HCPCS: Performed by: ORTHOPAEDIC SURGERY

## 2025-04-28 PROCEDURE — 2500000005 HC RX 250 GENERAL PHARMACY W/O HCPCS: Performed by: STUDENT IN AN ORGANIZED HEALTH CARE EDUCATION/TRAINING PROGRAM

## 2025-04-28 PROCEDURE — 2500000004 HC RX 250 GENERAL PHARMACY W/ HCPCS (ALT 636 FOR OP/ED): Mod: JZ | Performed by: ORTHOPAEDIC SURGERY

## 2025-04-28 PROCEDURE — 2500000004 HC RX 250 GENERAL PHARMACY W/ HCPCS (ALT 636 FOR OP/ED): Performed by: ORTHOPAEDIC SURGERY

## 2025-04-28 PROCEDURE — 7100000011 HC EXTENDED STAY RECOVERY HOURLY - NURSING UNIT

## 2025-04-28 PROCEDURE — 3700000002 HC GENERAL ANESTHESIA TIME - EACH INCREMENTAL 1 MINUTE: Performed by: ORTHOPAEDIC SURGERY

## 2025-04-28 PROCEDURE — 2500000001 HC RX 250 WO HCPCS SELF ADMINISTERED DRUGS (ALT 637 FOR MEDICARE OP): Performed by: STUDENT IN AN ORGANIZED HEALTH CARE EDUCATION/TRAINING PROGRAM

## 2025-04-28 PROCEDURE — A27130 PR TOTAL HIP ARTHROPLASTY: Performed by: ANESTHESIOLOGIST ASSISTANT

## 2025-04-28 PROCEDURE — 2500000005 HC RX 250 GENERAL PHARMACY W/O HCPCS: Performed by: ANESTHESIOLOGIST ASSISTANT

## 2025-04-28 PROCEDURE — 2500000005 HC RX 250 GENERAL PHARMACY W/O HCPCS: Performed by: ORTHOPAEDIC SURGERY

## 2025-04-28 PROCEDURE — 2500000004 HC RX 250 GENERAL PHARMACY W/ HCPCS (ALT 636 FOR OP/ED): Mod: JZ | Performed by: STUDENT IN AN ORGANIZED HEALTH CARE EDUCATION/TRAINING PROGRAM

## 2025-04-28 PROCEDURE — 72170 X-RAY EXAM OF PELVIS: CPT | Performed by: RADIOLOGY

## 2025-04-28 PROCEDURE — C1776 JOINT DEVICE (IMPLANTABLE): HCPCS | Performed by: ORTHOPAEDIC SURGERY

## 2025-04-28 PROCEDURE — 3600000005 HC OR TIME - INITIAL BASE CHARGE - PROCEDURE LEVEL FIVE: Performed by: ORTHOPAEDIC SURGERY

## 2025-04-28 PROCEDURE — 72170 X-RAY EXAM OF PELVIS: CPT

## 2025-04-28 PROCEDURE — 2500000004 HC RX 250 GENERAL PHARMACY W/ HCPCS (ALT 636 FOR OP/ED): Performed by: ANESTHESIOLOGIST ASSISTANT

## 2025-04-28 PROCEDURE — 3700000001 HC GENERAL ANESTHESIA TIME - INITIAL BASE CHARGE: Performed by: ORTHOPAEDIC SURGERY

## 2025-04-28 PROCEDURE — 2500000001 HC RX 250 WO HCPCS SELF ADMINISTERED DRUGS (ALT 637 FOR MEDICARE OP): Performed by: ORTHOPAEDIC SURGERY

## 2025-04-28 PROCEDURE — 2500000002 HC RX 250 W HCPCS SELF ADMINISTERED DRUGS (ALT 637 FOR MEDICARE OP, ALT 636 FOR OP/ED): Performed by: ORTHOPAEDIC SURGERY

## 2025-04-28 PROCEDURE — 3600000010 HC OR TIME - EACH INCREMENTAL 1 MINUTE - PROCEDURE LEVEL FIVE: Performed by: ORTHOPAEDIC SURGERY

## 2025-04-28 PROCEDURE — 2720000007 HC OR 272 NO HCPCS: Performed by: ORTHOPAEDIC SURGERY

## 2025-04-28 PROCEDURE — 7100000001 HC RECOVERY ROOM TIME - INITIAL BASE CHARGE: Performed by: ORTHOPAEDIC SURGERY

## 2025-04-28 DEVICE — 132 DEGREE NECK ANGLE HIP STEM
Type: IMPLANTABLE DEVICE | Site: HIP | Status: FUNCTIONAL
Brand: ACCOLADE

## 2025-04-28 DEVICE — TRIDENT X3 0 DEGREE POLYETHYLENE INSERT
Type: IMPLANTABLE DEVICE | Site: HIP | Status: FUNCTIONAL
Brand: TRIDENT X3 INSERT

## 2025-04-28 DEVICE — TRIDENT II CLUSTERHOLE HA ACETABULAR SHELL 50D
Type: IMPLANTABLE DEVICE | Site: HIP | Status: FUNCTIONAL
Brand: TRIDENT II

## 2025-04-28 DEVICE — CERAMIC V40 FEMORAL HEAD
Type: IMPLANTABLE DEVICE | Site: HIP | Status: FUNCTIONAL
Brand: BIOLOX

## 2025-04-28 RX ORDER — MEPERIDINE HYDROCHLORIDE 50 MG/ML
12.5 INJECTION INTRAMUSCULAR; INTRAVENOUS; SUBCUTANEOUS EVERY 10 MIN PRN
Status: DISCONTINUED | OUTPATIENT
Start: 2025-04-28 | End: 2025-04-28 | Stop reason: HOSPADM

## 2025-04-28 RX ORDER — GABAPENTIN 300 MG/1
600 CAPSULE ORAL ONCE
Status: COMPLETED | OUTPATIENT
Start: 2025-04-28 | End: 2025-04-28

## 2025-04-28 RX ORDER — MIDAZOLAM HYDROCHLORIDE 1 MG/ML
INJECTION, SOLUTION INTRAMUSCULAR; INTRAVENOUS AS NEEDED
Status: DISCONTINUED | OUTPATIENT
Start: 2025-04-28 | End: 2025-04-28

## 2025-04-28 RX ORDER — PROPOFOL 10 MG/ML
INJECTION, EMULSION INTRAVENOUS CONTINUOUS PRN
Status: DISCONTINUED | OUTPATIENT
Start: 2025-04-28 | End: 2025-04-28

## 2025-04-28 RX ORDER — NALOXONE HYDROCHLORIDE 0.4 MG/ML
0.2 INJECTION, SOLUTION INTRAMUSCULAR; INTRAVENOUS; SUBCUTANEOUS EVERY 5 MIN PRN
Status: DISCONTINUED | OUTPATIENT
Start: 2025-04-28 | End: 2025-04-29 | Stop reason: HOSPADM

## 2025-04-28 RX ORDER — ACETAMINOPHEN 325 MG/1
650 TABLET ORAL EVERY 6 HOURS
Status: DISCONTINUED | OUTPATIENT
Start: 2025-04-28 | End: 2025-04-29 | Stop reason: HOSPADM

## 2025-04-28 RX ORDER — FENTANYL CITRATE 50 UG/ML
INJECTION, SOLUTION INTRAMUSCULAR; INTRAVENOUS AS NEEDED
Status: DISCONTINUED | OUTPATIENT
Start: 2025-04-28 | End: 2025-04-28

## 2025-04-28 RX ORDER — ONDANSETRON HYDROCHLORIDE 2 MG/ML
4 INJECTION, SOLUTION INTRAVENOUS ONCE AS NEEDED
Status: DISCONTINUED | OUTPATIENT
Start: 2025-04-28 | End: 2025-04-28 | Stop reason: HOSPADM

## 2025-04-28 RX ORDER — OXYCODONE HYDROCHLORIDE 5 MG/1
5 TABLET ORAL EVERY 4 HOURS PRN
Refills: 0 | Status: DISCONTINUED | OUTPATIENT
Start: 2025-04-28 | End: 2025-04-29 | Stop reason: HOSPADM

## 2025-04-28 RX ORDER — SODIUM CHLORIDE, SODIUM LACTATE, POTASSIUM CHLORIDE, CALCIUM CHLORIDE 600; 310; 30; 20 MG/100ML; MG/100ML; MG/100ML; MG/100ML
50 INJECTION, SOLUTION INTRAVENOUS CONTINUOUS
Status: ACTIVE | OUTPATIENT
Start: 2025-04-28 | End: 2025-04-29

## 2025-04-28 RX ORDER — PHENYLEPHRINE 10 MG/250 ML(40 MCG/ML)IN 0.9 % SOD.CHLORIDE INTRAVENOUS
CONTINUOUS PRN
Status: DISCONTINUED | OUTPATIENT
Start: 2025-04-28 | End: 2025-04-28

## 2025-04-28 RX ORDER — ACETAMINOPHEN 325 MG/1
650 TABLET ORAL EVERY 4 HOURS PRN
Status: DISCONTINUED | OUTPATIENT
Start: 2025-04-28 | End: 2025-04-28

## 2025-04-28 RX ORDER — LABETALOL HYDROCHLORIDE 5 MG/ML
5 INJECTION, SOLUTION INTRAVENOUS ONCE AS NEEDED
Status: DISCONTINUED | OUTPATIENT
Start: 2025-04-28 | End: 2025-04-28 | Stop reason: HOSPADM

## 2025-04-28 RX ORDER — SODIUM CHLORIDE, SODIUM LACTATE, POTASSIUM CHLORIDE, CALCIUM CHLORIDE 600; 310; 30; 20 MG/100ML; MG/100ML; MG/100ML; MG/100ML
50 INJECTION, SOLUTION INTRAVENOUS CONTINUOUS
Status: DISCONTINUED | OUTPATIENT
Start: 2025-04-28 | End: 2025-04-29 | Stop reason: HOSPADM

## 2025-04-28 RX ORDER — POLYETHYLENE GLYCOL 3350 17 G/17G
17 POWDER, FOR SOLUTION ORAL DAILY
Status: DISCONTINUED | OUTPATIENT
Start: 2025-04-28 | End: 2025-04-29 | Stop reason: HOSPADM

## 2025-04-28 RX ORDER — CEFAZOLIN SODIUM 2 G/100ML
2 INJECTION, SOLUTION INTRAVENOUS ONCE
Status: COMPLETED | OUTPATIENT
Start: 2025-04-28 | End: 2025-04-28

## 2025-04-28 RX ORDER — ACETAMINOPHEN 325 MG/1
975 TABLET ORAL ONCE
Status: COMPLETED | OUTPATIENT
Start: 2025-04-28 | End: 2025-04-28

## 2025-04-28 RX ORDER — OXYCODONE HYDROCHLORIDE 10 MG/1
10 TABLET ORAL EVERY 4 HOURS PRN
Status: DISCONTINUED | OUTPATIENT
Start: 2025-04-28 | End: 2025-04-29 | Stop reason: HOSPADM

## 2025-04-28 RX ORDER — CEFAZOLIN SODIUM 2 G/100ML
2 INJECTION, SOLUTION INTRAVENOUS EVERY 8 HOURS
Status: COMPLETED | OUTPATIENT
Start: 2025-04-28 | End: 2025-04-29

## 2025-04-28 RX ORDER — LEVOTHYROXINE SODIUM 50 UG/1
50 TABLET ORAL DAILY
Status: DISCONTINUED | OUTPATIENT
Start: 2025-04-29 | End: 2025-04-29 | Stop reason: HOSPADM

## 2025-04-28 RX ORDER — ALBUTEROL SULFATE 0.83 MG/ML
2.5 SOLUTION RESPIRATORY (INHALATION) ONCE AS NEEDED
Status: DISCONTINUED | OUTPATIENT
Start: 2025-04-28 | End: 2025-04-28 | Stop reason: HOSPADM

## 2025-04-28 RX ORDER — NORETHINDRONE AND ETHINYL ESTRADIOL 0.5-0.035
KIT ORAL AS NEEDED
Status: DISCONTINUED | OUTPATIENT
Start: 2025-04-28 | End: 2025-04-28

## 2025-04-28 RX ORDER — TRANEXAMIC ACID 650 MG/1
1300 TABLET ORAL ONCE
Status: COMPLETED | OUTPATIENT
Start: 2025-04-28 | End: 2025-04-28

## 2025-04-28 RX ORDER — LISINOPRIL 10 MG/1
10 TABLET ORAL DAILY
Status: DISCONTINUED | OUTPATIENT
Start: 2025-04-28 | End: 2025-04-29 | Stop reason: HOSPADM

## 2025-04-28 RX ORDER — PHENYLEPHRINE HYDROCHLORIDE 10 MG/ML
INJECTION INTRAVENOUS AS NEEDED
Status: DISCONTINUED | OUTPATIENT
Start: 2025-04-28 | End: 2025-04-28

## 2025-04-28 RX ORDER — BUPIVACAINE HYDROCHLORIDE 7.5 MG/ML
INJECTION, SOLUTION EPIDURAL; RETROBULBAR AS NEEDED
Status: DISCONTINUED | OUTPATIENT
Start: 2025-04-28 | End: 2025-04-28

## 2025-04-28 RX ORDER — CELECOXIB 200 MG/1
200 CAPSULE ORAL ONCE
Status: COMPLETED | OUTPATIENT
Start: 2025-04-28 | End: 2025-04-28

## 2025-04-28 RX ORDER — SODIUM CHLORIDE, SODIUM LACTATE, POTASSIUM CHLORIDE, CALCIUM CHLORIDE 600; 310; 30; 20 MG/100ML; MG/100ML; MG/100ML; MG/100ML
100 INJECTION, SOLUTION INTRAVENOUS CONTINUOUS
Status: ACTIVE | OUTPATIENT
Start: 2025-04-28 | End: 2025-04-28

## 2025-04-28 RX ORDER — ASPIRIN 81 MG/1
81 TABLET ORAL 2 TIMES DAILY
Status: DISCONTINUED | OUTPATIENT
Start: 2025-04-29 | End: 2025-04-29 | Stop reason: HOSPADM

## 2025-04-28 RX ORDER — OXYCODONE HYDROCHLORIDE 5 MG/1
5 TABLET ORAL EVERY 4 HOURS PRN
Status: DISCONTINUED | OUTPATIENT
Start: 2025-04-28 | End: 2025-04-28 | Stop reason: HOSPADM

## 2025-04-28 RX ORDER — FENTANYL CITRATE 50 UG/ML
25 INJECTION, SOLUTION INTRAMUSCULAR; INTRAVENOUS EVERY 5 MIN PRN
Status: DISCONTINUED | OUTPATIENT
Start: 2025-04-28 | End: 2025-04-28 | Stop reason: HOSPADM

## 2025-04-28 RX ORDER — HYDROCODONE BITARTRATE AND ACETAMINOPHEN 5; 325 MG/1; MG/1
1 TABLET ORAL EVERY 4 HOURS PRN
Status: DISCONTINUED | OUTPATIENT
Start: 2025-04-28 | End: 2025-04-28

## 2025-04-28 RX ORDER — HYDROMORPHONE HYDROCHLORIDE 0.2 MG/ML
0.2 INJECTION INTRAMUSCULAR; INTRAVENOUS; SUBCUTANEOUS EVERY 4 HOURS PRN
Status: DISCONTINUED | OUTPATIENT
Start: 2025-04-28 | End: 2025-04-29 | Stop reason: HOSPADM

## 2025-04-28 RX ORDER — ONDANSETRON HYDROCHLORIDE 2 MG/ML
4 INJECTION, SOLUTION INTRAVENOUS EVERY 8 HOURS PRN
Status: DISCONTINUED | OUTPATIENT
Start: 2025-04-28 | End: 2025-04-29 | Stop reason: HOSPADM

## 2025-04-28 RX ORDER — ONDANSETRON 4 MG/1
4 TABLET, FILM COATED ORAL EVERY 8 HOURS PRN
Status: DISCONTINUED | OUTPATIENT
Start: 2025-04-28 | End: 2025-04-29 | Stop reason: HOSPADM

## 2025-04-28 RX ORDER — GLYCOPYRROLATE 0.2 MG/ML
INJECTION INTRAMUSCULAR; INTRAVENOUS AS NEEDED
Status: DISCONTINUED | OUTPATIENT
Start: 2025-04-28 | End: 2025-04-28

## 2025-04-28 RX ADMIN — PROPOFOL 120 MCG/KG/MIN: 10 INJECTION, EMULSION INTRAVENOUS at 13:50

## 2025-04-28 RX ADMIN — SODIUM CHLORIDE, SODIUM LACTATE, POTASSIUM CHLORIDE, AND CALCIUM CHLORIDE 50 ML/HR: .6; .31; .03; .02 INJECTION, SOLUTION INTRAVENOUS at 12:40

## 2025-04-28 RX ADMIN — POVIDONE-IODINE 1 APPLICATION: 5 SOLUTION TOPICAL at 12:39

## 2025-04-28 RX ADMIN — SODIUM CHLORIDE, SODIUM LACTATE, POTASSIUM CHLORIDE, AND CALCIUM CHLORIDE 50 ML/HR: .6; .31; .03; .02 INJECTION, SOLUTION INTRAVENOUS at 17:24

## 2025-04-28 RX ADMIN — Medication 6 L/MIN: at 15:12

## 2025-04-28 RX ADMIN — Medication 0.4 MCG/KG/MIN: at 13:50

## 2025-04-28 RX ADMIN — CEFAZOLIN SODIUM 2 G: 2 INJECTION, SOLUTION INTRAVENOUS at 20:45

## 2025-04-28 RX ADMIN — ACETAMINOPHEN 975 MG: 325 TABLET ORAL at 12:45

## 2025-04-28 RX ADMIN — TRANEXAMIC ACID 1300 MG: 650 TABLET ORAL at 12:46

## 2025-04-28 RX ADMIN — EPHEDRINE SULFATE 5 MG: 50 INJECTION, SOLUTION INTRAVENOUS at 14:05

## 2025-04-28 RX ADMIN — EPHEDRINE SULFATE 5 MG: 50 INJECTION, SOLUTION INTRAVENOUS at 14:20

## 2025-04-28 RX ADMIN — BUPIVACAINE HYDROCHLORIDE 1.8 ML: 7.5 INJECTION, SOLUTION EPIDURAL; RETROBULBAR at 13:40

## 2025-04-28 RX ADMIN — PHENYLEPHRINE HYDROCHLORIDE 100 MCG: 10 INJECTION INTRAVENOUS at 13:50

## 2025-04-28 RX ADMIN — FENTANYL CITRATE 25 MCG: 50 INJECTION, SOLUTION INTRAMUSCULAR; INTRAVENOUS at 13:44

## 2025-04-28 RX ADMIN — PROPOFOL 150 MCG/KG/MIN: 10 INJECTION, EMULSION INTRAVENOUS at 13:43

## 2025-04-28 RX ADMIN — CELECOXIB 200 MG: 200 CAPSULE ORAL at 12:45

## 2025-04-28 RX ADMIN — GLYCOPYRROLATE 0.2 MG: 0.2 INJECTION, SOLUTION INTRAMUSCULAR; INTRAVENOUS at 13:52

## 2025-04-28 RX ADMIN — PROPOFOL 50 MG: 10 INJECTION, EMULSION INTRAVENOUS at 13:42

## 2025-04-28 RX ADMIN — OXYCODONE HYDROCHLORIDE 5 MG: 5 TABLET ORAL at 16:26

## 2025-04-28 RX ADMIN — CEFAZOLIN SODIUM 2 G: 2 INJECTION, SOLUTION INTRAVENOUS at 13:44

## 2025-04-28 RX ADMIN — FENTANYL CITRATE 25 MCG: 50 INJECTION, SOLUTION INTRAMUSCULAR; INTRAVENOUS at 15:49

## 2025-04-28 RX ADMIN — FENTANYL CITRATE 25 MCG: 50 INJECTION, SOLUTION INTRAMUSCULAR; INTRAVENOUS at 16:08

## 2025-04-28 RX ADMIN — SODIUM CHLORIDE, SODIUM LACTATE, POTASSIUM CHLORIDE, AND CALCIUM CHLORIDE 50 ML/HR: .6; .31; .03; .02 INJECTION, SOLUTION INTRAVENOUS at 20:46

## 2025-04-28 RX ADMIN — MIDAZOLAM 2 MG: 1 INJECTION INTRAMUSCULAR; INTRAVENOUS at 13:30

## 2025-04-28 RX ADMIN — GABAPENTIN 600 MG: 300 CAPSULE ORAL at 12:45

## 2025-04-28 SDOH — SOCIAL STABILITY: SOCIAL INSECURITY
WITHIN THE LAST YEAR, HAVE YOU BEEN KICKED, HIT, SLAPPED, OR OTHERWISE PHYSICALLY HURT BY YOUR PARTNER OR EX-PARTNER?: NO

## 2025-04-28 SDOH — SOCIAL STABILITY: SOCIAL INSECURITY
WITHIN THE LAST YEAR, HAVE YOU BEEN RAPED OR FORCED TO HAVE ANY KIND OF SEXUAL ACTIVITY BY YOUR PARTNER OR EX-PARTNER?: NO

## 2025-04-28 SDOH — ECONOMIC STABILITY: FOOD INSECURITY: WITHIN THE PAST 12 MONTHS, THE FOOD YOU BOUGHT JUST DIDN'T LAST AND YOU DIDN'T HAVE MONEY TO GET MORE.: NEVER TRUE

## 2025-04-28 SDOH — ECONOMIC STABILITY: HOUSING INSECURITY: IN THE LAST 12 MONTHS, WAS THERE A TIME WHEN YOU WERE NOT ABLE TO PAY THE MORTGAGE OR RENT ON TIME?: NO

## 2025-04-28 SDOH — SOCIAL STABILITY: SOCIAL INSECURITY: WITHIN THE LAST YEAR, HAVE YOU BEEN HUMILIATED OR EMOTIONALLY ABUSED IN OTHER WAYS BY YOUR PARTNER OR EX-PARTNER?: NO

## 2025-04-28 SDOH — SOCIAL STABILITY: SOCIAL INSECURITY: WITHIN THE LAST YEAR, HAVE YOU BEEN AFRAID OF YOUR PARTNER OR EX-PARTNER?: NO

## 2025-04-28 SDOH — ECONOMIC STABILITY: FOOD INSECURITY: HOW HARD IS IT FOR YOU TO PAY FOR THE VERY BASICS LIKE FOOD, HOUSING, MEDICAL CARE, AND HEATING?: NOT HARD AT ALL

## 2025-04-28 SDOH — ECONOMIC STABILITY: HOUSING INSECURITY: AT ANY TIME IN THE PAST 12 MONTHS, WERE YOU HOMELESS OR LIVING IN A SHELTER (INCLUDING NOW)?: NO

## 2025-04-28 SDOH — ECONOMIC STABILITY: HOUSING INSECURITY: IN THE PAST 12 MONTHS, HOW MANY TIMES HAVE YOU MOVED WHERE YOU WERE LIVING?: 1

## 2025-04-28 SDOH — SOCIAL STABILITY: SOCIAL INSECURITY: ARE YOU OR HAVE YOU BEEN THREATENED OR ABUSED PHYSICALLY, EMOTIONALLY, OR SEXUALLY BY ANYONE?: NO

## 2025-04-28 SDOH — ECONOMIC STABILITY: FOOD INSECURITY: WITHIN THE PAST 12 MONTHS, YOU WORRIED THAT YOUR FOOD WOULD RUN OUT BEFORE YOU GOT THE MONEY TO BUY MORE.: NEVER TRUE

## 2025-04-28 SDOH — SOCIAL STABILITY: SOCIAL INSECURITY: DO YOU FEEL ANYONE HAS EXPLOITED OR TAKEN ADVANTAGE OF YOU FINANCIALLY OR OF YOUR PERSONAL PROPERTY?: NO

## 2025-04-28 SDOH — SOCIAL STABILITY: SOCIAL INSECURITY: WERE YOU ABLE TO COMPLETE ALL THE BEHAVIORAL HEALTH SCREENINGS?: YES

## 2025-04-28 SDOH — SOCIAL STABILITY: SOCIAL INSECURITY: HAS ANYONE EVER THREATENED TO HURT YOUR FAMILY OR YOUR PETS?: NO

## 2025-04-28 SDOH — ECONOMIC STABILITY: INCOME INSECURITY: IN THE PAST 12 MONTHS HAS THE ELECTRIC, GAS, OIL, OR WATER COMPANY THREATENED TO SHUT OFF SERVICES IN YOUR HOME?: NO

## 2025-04-28 SDOH — ECONOMIC STABILITY: TRANSPORTATION INSECURITY: IN THE PAST 12 MONTHS, HAS LACK OF TRANSPORTATION KEPT YOU FROM MEDICAL APPOINTMENTS OR FROM GETTING MEDICATIONS?: NO

## 2025-04-28 SDOH — SOCIAL STABILITY: SOCIAL INSECURITY: DO YOU FEEL UNSAFE GOING BACK TO THE PLACE WHERE YOU ARE LIVING?: NO

## 2025-04-28 SDOH — SOCIAL STABILITY: SOCIAL INSECURITY: DOES ANYONE TRY TO KEEP YOU FROM HAVING/CONTACTING OTHER FRIENDS OR DOING THINGS OUTSIDE YOUR HOME?: NO

## 2025-04-28 SDOH — SOCIAL STABILITY: SOCIAL INSECURITY: HAVE YOU HAD THOUGHTS OF HARMING ANYONE ELSE?: NO

## 2025-04-28 SDOH — SOCIAL STABILITY: SOCIAL INSECURITY: ABUSE: ADULT

## 2025-04-28 SDOH — HEALTH STABILITY: MENTAL HEALTH: CURRENT SMOKER: 0

## 2025-04-28 SDOH — SOCIAL STABILITY: SOCIAL INSECURITY: ARE THERE ANY APPARENT SIGNS OF INJURIES/BEHAVIORS THAT COULD BE RELATED TO ABUSE/NEGLECT?: NO

## 2025-04-28 ASSESSMENT — PAIN - FUNCTIONAL ASSESSMENT
PAIN_FUNCTIONAL_ASSESSMENT: WONG-BAKER FACES
PAIN_FUNCTIONAL_ASSESSMENT: 0-10
PAIN_FUNCTIONAL_ASSESSMENT: WONG-BAKER FACES
PAIN_FUNCTIONAL_ASSESSMENT: 0-10
PAIN_FUNCTIONAL_ASSESSMENT: 0-10
PAIN_FUNCTIONAL_ASSESSMENT: WONG-BAKER FACES
PAIN_FUNCTIONAL_ASSESSMENT: 0-10
PAIN_FUNCTIONAL_ASSESSMENT: WONG-BAKER FACES
PAIN_FUNCTIONAL_ASSESSMENT: 0-10

## 2025-04-28 ASSESSMENT — COGNITIVE AND FUNCTIONAL STATUS - GENERAL
STANDING UP FROM CHAIR USING ARMS: A LOT
PERSONAL GROOMING: A LITTLE
DRESSING REGULAR LOWER BODY CLOTHING: A LOT
TURNING FROM BACK TO SIDE WHILE IN FLAT BAD: A LITTLE
MOVING TO AND FROM BED TO CHAIR: A LITTLE
TOILETING: A LITTLE
MOVING FROM LYING ON BACK TO SITTING ON SIDE OF FLAT BED WITH BEDRAILS: A LITTLE
PATIENT BASELINE BEDBOUND: NO
DAILY ACTIVITIY SCORE: 18
DRESSING REGULAR UPPER BODY CLOTHING: A LITTLE
MOBILITY SCORE: 15
HELP NEEDED FOR BATHING: A LITTLE
CLIMB 3 TO 5 STEPS WITH RAILING: A LOT
WALKING IN HOSPITAL ROOM: A LOT

## 2025-04-28 ASSESSMENT — PAIN SCALES - GENERAL
PAINLEVEL_OUTOF10: 0 - NO PAIN
PAINLEVEL_OUTOF10: 5 - MODERATE PAIN
PAINLEVEL_OUTOF10: 6
PAINLEVEL_OUTOF10: 4
PAIN_LEVEL: 0

## 2025-04-28 ASSESSMENT — PAIN SCALES - WONG BAKER
WONGBAKER_NUMERICALRESPONSE: NO HURT
WONGBAKER_NUMERICALRESPONSE: NO HURT
WONGBAKER_NUMERICALRESPONSE: HURTS LITTLE MORE
WONGBAKER_NUMERICALRESPONSE: HURTS LITTLE MORE
WONGBAKER_NUMERICALRESPONSE: NO HURT
WONGBAKER_NUMERICALRESPONSE: HURTS LITTLE MORE
WONGBAKER_NUMERICALRESPONSE: NO HURT

## 2025-04-28 ASSESSMENT — PAIN DESCRIPTION - DESCRIPTORS
DESCRIPTORS: PATIENT UNABLE TO DESCRIBE
DESCRIPTORS: DULL;ACHING
DESCRIPTORS: NAGGING

## 2025-04-28 ASSESSMENT — ACTIVITIES OF DAILY LIVING (ADL)
LACK_OF_TRANSPORTATION: NO
GROOMING: INDEPENDENT
LACK_OF_TRANSPORTATION: NO
JUDGMENT_ADEQUATE_SAFELY_COMPLETE_DAILY_ACTIVITIES: YES
BATHING: INDEPENDENT
WALKS IN HOME: INDEPENDENT
FEEDING YOURSELF: INDEPENDENT
TOILETING: INDEPENDENT
PATIENT'S MEMORY ADEQUATE TO SAFELY COMPLETE DAILY ACTIVITIES?: YES
ADEQUATE_TO_COMPLETE_ADL: YES
HEARING - RIGHT EAR: FUNCTIONAL
HEARING - LEFT EAR: FUNCTIONAL
DRESSING YOURSELF: INDEPENDENT

## 2025-04-28 ASSESSMENT — LIFESTYLE VARIABLES
AUDIT-C TOTAL SCORE: 0
AUDIT-C TOTAL SCORE: 0
HOW MANY STANDARD DRINKS CONTAINING ALCOHOL DO YOU HAVE ON A TYPICAL DAY: PATIENT DOES NOT DRINK
HOW OFTEN DO YOU HAVE 6 OR MORE DRINKS ON ONE OCCASION: NEVER
HOW OFTEN DO YOU HAVE A DRINK CONTAINING ALCOHOL: NEVER
SKIP TO QUESTIONS 9-10: 1

## 2025-04-28 ASSESSMENT — COLUMBIA-SUICIDE SEVERITY RATING SCALE - C-SSRS
6. HAVE YOU EVER DONE ANYTHING, STARTED TO DO ANYTHING, OR PREPARED TO DO ANYTHING TO END YOUR LIFE?: NO
2. HAVE YOU ACTUALLY HAD ANY THOUGHTS OF KILLING YOURSELF?: NO
1. IN THE PAST MONTH, HAVE YOU WISHED YOU WERE DEAD OR WISHED YOU COULD GO TO SLEEP AND NOT WAKE UP?: NO

## 2025-04-28 ASSESSMENT — PATIENT HEALTH QUESTIONNAIRE - PHQ9
2. FEELING DOWN, DEPRESSED OR HOPELESS: NOT AT ALL
SUM OF ALL RESPONSES TO PHQ9 QUESTIONS 1 & 2: 0
1. LITTLE INTEREST OR PLEASURE IN DOING THINGS: NOT AT ALL

## 2025-04-28 NOTE — BRIEF OP NOTE
Date: 2025  OR Location: [facility] OR    Name: Chely Prasad, : 1955, Age: 69 y.o., MRN: [id number], Sex: female    Diagnosis  * No Diagnosis Codes entered * * No Diagnosis Codes entered *     Procedures  ARTHROPLASTY, HIP, TOTAL, WITHOUT CEMENT  [id number] - MI ARTHRP ACETBLR/PROX FEM PROSTC AGRFT/ALGRFT      Surgeons      * Fady Trevino - Primary    Resident/Fellow/Other Assistant:  Surgeons and Role:  * No surgeons found with a matching role *    Staff:   Circulator: Zaida  Scrub Person: Jazmyne  Scrub Person: Brady  Scrub Person: Jazmyne    Anesthesia Staff: Anesthesiologist: DO MISTY LindaAA: ADELA Riley    Procedure Summary  Anesthesia: Monitor Anesthesia Care, Spinal  ASA: ASA status not filed in the log.  Estimated Blood Loss: 200mL  Intra-op Medications:   Administrations occurring from 1400 to 1625 on 25:   Medication Name Total Dose   lactated Ringer's infusion Cannot be calculated   ePHEDrine injection 10 mg   phenylephrine (Som-Synephrine) 10 mg/250 mL NS (40 mcg/mL) infusion 1.65 mg   propofol (Diprivan) injection 10 mg/mL 347.94 mg              Anesthesia Record               Intraprocedure I/O Totals          Intake    Phenylephrine Drip 0.00 mL    The total shown is the total volume documented since Anesthesia Start was filed.    lactated Ringer's infusion 400.00 mL    ceFAZolin (Ancef) 2 g in dextrose (iso)  mL 100.00 mL    Total Intake 500 mL          Specimen: No specimens collected               Findings: See full op note    Complications:  None; patient tolerated the procedure well.     Disposition: PACU - hemodynamically stable.  Condition: stable  Specimens Collected: No specimens collected  Attending Attestation:     Fady Trevino  Phone Number: [phone]

## 2025-04-28 NOTE — OP NOTE
ARTHROPLASTY, HIP, TOTAL, WITHOUT CEMENT (L) Operative Note     Date: 2025  OR Location: [facility] OR    Name: Chely Prasad, : 1955, Age: 69 y.o., MRN: [id number], Sex: female    Diagnosis  * No Diagnosis Codes entered * * No Diagnosis Codes entered *     Procedures  ARTHROPLASTY, HIP, TOTAL, WITHOUT CEMENT  [id number] - ME ARTHRP ACETBLR/PROX FEM PROSTC AGRFT/ALGRFT      Surgeons      * Fady Trevino - Primary    Resident/Fellow/Other Assistant:  Surgeons and Role:  * No surgeons found with a matching role *    Staff:   Circulator: Zaida  Scrub Person: Jazmyne  Scrub Person: Brady Voub Person: Jazmyne    Anesthesia Staff: Anesthesiologist: DO MISTY LindaAA: ADELA Riley    Procedure Summary  Anesthesia: Monitor Anesthesia Care, Spinal  ASA: ASA status not filed in the log.  Estimated Blood Loss: 200mL  Intra-op Medications:   Administrations occurring from 1400 to 1625 on 25:   Medication Name Total Dose   lactated Ringer's infusion Cannot be calculated   ePHEDrine injection 10 mg   phenylephrine (Som-Synephrine) 10 mg/250 mL NS (40 mcg/mL) infusion 1.65 mg   propofol (Diprivan) injection 10 mg/mL 347.94 mg              Anesthesia Record               Intraprocedure I/O Totals          Intake    Phenylephrine Drip 0.00 mL    The total shown is the total volume documented since Anesthesia Start was filed.    lactated Ringer's infusion 400.00 mL    ceFAZolin (Ancef) 2 g in dextrose (iso)  mL 100.00 mL    Total Intake 500 mL          Specimen: No specimens collected              Drains and/or Catheters: * None in log *    Tourniquet Times:         Implants:  Implants       Type Name Action Serial No.      Joint SHELL, TRIDENT II, CLUSTERHOLE, HA 50D - [id number] Implanted      Joint INSERT, TRIDENT X3 POLYETHYLENE, 0 DEG, 32MM D - [id number] Implanted      Joint STEM, FEMUR 132D SZ 5 ACCOLADE II - [id number] Implanted      Joint HEAD, FEMUR V40 32MM 0MM BIOLOX DELTA - [id number]  Implanted               Findings: See below    Indications: Chely Prasad is an 69 y.o. female who is having surgery for M16.12 osteoarthritis left hip.  Failed conservative treatment    The patient was seen in the preoperative area. The risks, benefits, complications, treatment options, non-operative alternatives, expected recovery and outcomes were discussed with the patient. The possibilities of reaction to medication, pulmonary aspiration, injury to surrounding structures, bleeding, recurrent infection, the need for additional procedures, failure to diagnose a condition, and creating a complication requiring transfusion or operation were discussed with the patient. The patient concurred with the proposed plan, giving informed consent.  The site of surgery was properly noted/marked if necessary per policy. The patient has been actively warmed in preoperative area. Preoperative antibiotics given preoperatively 20 minutes prior to incision venous thrombosis prophylaxis contralateral mechanical    Procedure Details: The patient was brought to the operative suite, identified, induced successful spinal anesthesia .  The patient was then carefully placed in the lateral decubitus position with the right hip being superior.  The axilla as well as peroneal nerve on the dependent side were well padded and protected.  Next, the right lower extremity was prepped and draped in the usual sterile fashion from the buttocks to the toes circumferentially.  After this was done, a time-out was performed.    .  Incision was made, lateral hip dissection was carried down through subcutaneous fat.  Deep fascia was then identified and divided along with the incision.  The fascia was then carefully retracted anteriorly and posteriorly exposing the patient's hip musculature, taking care to identify and protect the sciatic nerve, throughout the entire course of the operation. Short external rotators were resected from the patient's  femoral neck.  The hip was dislocated revealing severe degenerative arthritis involving 80% weight bearing surface.    Femoral neck osteotomy was performed using an oscillating saw after this was properly measured. The acetabulum architecture was carefully revealed by removing soft tissue from the periphery. The acetabulum was carefully deepened using circumferential reamers to 50 mm. A 50 mm acetabular shell neutral 32 was inserted with excellent fit.   Attention was then placed to the proximal femur, which was brought into view using well-padded hip skid.  Proximal femur was then reamed and broached to accept a # 5 Accolade 2 broach.  A +0, 32 mm ball was then used to most closely reproduce the original femoral neck length.  The hip was reduced with the trial components in and were found to be very stable.  For this reason, all trial components were removed. The permanent neutral polyethylene liner was inserted into the acetabular shell.    The permanent # 5 Accolade 2 stem was impacted into place.  This is 132 degree neck angle +0, 32 mm  Ceramic BIOLOX  femoral head was impacted on the trunnion of the taper.  The hip was reduced for final time, found to be stable in all ranges.  Wound was thoroughly irrigated with saline solution.  Posterior capsule and deep fascia and subcutaneous tissue and skin were closed in layers.  Glued mesh was used to seal the incision. A sterile dressing was placed in the patient's hip.  The patient is then roused from anesthesia and transferred to the postanaesthesia care unit in stable condition.   The first assistant, physician assistant, was present for the entire operation and was a key portion of the surgical team, being responsible for aiding in positioning exposure vital organ protection and closure.  Evidence of Infection:   Complications:  None; patient tolerated the procedure well.    Disposition: PACU - hemodynamically stable.  Condition: stable                 Additional  Details:     Attending Attestation:     Fady Trevino  Phone Number: [phone]

## 2025-04-28 NOTE — CARE PLAN
Problem: Pain - Adult  Goal: Verbalizes/displays adequate comfort level or baseline comfort level  4/28/2025 1740 by Radha Marshall RN  Outcome: Progressing  4/28/2025 1739 by Radha Marshall RN  Outcome: Progressing  4/28/2025 1738 by Radha Marshall RN  Outcome: Progressing     Problem: Safety - Adult  Goal: Free from fall injury  4/28/2025 1740 by Radha Marshall RN  Outcome: Progressing  4/28/2025 1739 by Radha Marshall RN  Outcome: Progressing  4/28/2025 1738 by Radha Marshall RN  Outcome: Progressing     Problem: Discharge Planning  Goal: Discharge to home or other facility with appropriate resources  4/28/2025 1740 by Radha Marshall RN  Outcome: Progressing  4/28/2025 1739 by Radha Marshall RN  Outcome: Progressing  4/28/2025 1738 by Radha Marshall RN  Outcome: Progressing     Problem: Chronic Conditions and Co-morbidities  Goal: Patient's chronic conditions and co-morbidity symptoms are monitored and maintained or improved  4/28/2025 1740 by Radha Marshall RN  Outcome: Progressing  4/28/2025 1739 by Radha Marshall RN  Outcome: Progressing  4/28/2025 1738 by Radha Marshall RN  Outcome: Progressing     Problem: Nutrition  Goal: Nutrient intake appropriate for maintaining nutritional needs  4/28/2025 1740 by Radha Marshall RN  Outcome: Progressing  4/28/2025 1739 by Radha Marshall RN  Outcome: Progressing  4/28/2025 1738 by Radha Marshall RN  Outcome: Progressing     Problem: Pain  Goal: Takes deep breaths with improved pain control throughout the shift  4/28/2025 1740 by Radha Marshall RN  Outcome: Progressing  4/28/2025 1739 by Radha Marshall RN  Outcome: Progressing  4/28/2025 1738 by Radha Marshall RN  Outcome: Progressing     Problem: Pain  Goal: Turns in bed with improved pain control throughout the shift  4/28/2025 1740 by Radha Marshall RN  Outcome: Progressing  4/28/2025 1739 by Radha Marshall RN  Outcome: Progressing  4/28/2025 1738 by Radha Marshall RN  Outcome: Progressing     Problem: Pain  Goal: Turns in bed with improved pain control  throughout the shift  4/28/2025 1740 by Radha Marshall RN  Outcome: Progressing     Problem: Pain  Goal: Walks with improved pain control throughout the shift  4/28/2025 1740 by Radha Marshall RN  Outcome: Progressing  4/28/2025 1739 by Radha Marshall RN  Outcome: Progressing  4/28/2025 1738 by Radha Marshall RN  Outcome: Progressing     Problem: Pain  Goal: Performs ADL's with improved pain control throughout shift  4/28/2025 1740 by Radha Marshall RN  Outcome: Progressing  4/28/2025 1739 by Radha Marshall RN  Outcome: Progressing  4/28/2025 1738 by Radha Marshall RN  Outcome: Progressing     Problem: Pain  Goal: Participates in PT with improved pain control throughout the shift  4/28/2025 1740 by Radha Marshall RN  Outcome: Progressing  4/28/2025 1739 by Radha Marshall RN  Outcome: Progressing  4/28/2025 1738 by Radha Marshall RN  Outcome: Progressing     Problem: Pain  Goal: Free from opioid side effects throughout the shift  4/28/2025 1740 by Radha Marshall RN  Outcome: Progressing  4/28/2025 1739 by Radha Marshall RN  Outcome: Progressing  4/28/2025 1738 by Radha Marshall RN  Outcome: Progressing     Problem: Pain  Goal: Free from opioid side effects throughout the shift  4/28/2025 1738 by Radha Marshall RN  Outcome: Progressing     Problem: Pain  Goal: Free from acute confusion related to pain meds throughout the shift  4/28/2025 1740 by Radha Marshall RN  Outcome: Progressing  4/28/2025 1739 by Radha Marshall RN  Outcome: Progressing  4/28/2025 1738 by Radha Marshall RN  Outcome: Progressing   f2  Problem: Pain  Goal: Free from acute confusion related to pain meds throughout the shift  4/28/2025 1740 by Radha Marshall RN  Outcome: Progressing  4/28/2025 1739 by Radha Marshall RN  Outcome: Progressing  4/28/2025 1738 by Radha Marshall RN  Outcome: Progressing     Problem: Pain  Goal: Free from opioid side effects throughout the shift  4/28/2025 1740 by Radha Marshall RN  Outcome: Progressing  4/28/2025 1739 by Radha Marshall RN  Outcome:  Progressing  4/28/2025 1738 by Radha Marshall RN  Outcome: Progressing     Problem: Pain  Goal: Participates in PT with improved pain control throughout the shift  4/28/2025 1740 by Radha Marshall RN  Outcome: Progressing  4/28/2025 1739 by Radha Marshall RN  Outcome: Progressing  4/28/2025 1738 by Radha Marshall RN  Outcome: Progressing     Problem: Pain  Goal: Performs ADL's with improved pain control throughout shift  4/28/2025 1740 by Radha Marshall RN  Outcome: Progressing     Problem: Pain  Goal: Performs ADL's with improved pain control throughout shift  4/28/2025 1740 by Radha Marshall RN  Outcome: Progressing  4/28/2025 1739 by Radha Marshall RN  Outcome: Progressing  4/28/2025 1738 by Radha Marshall RN  Outcome: Progressing     Problem: Pain  Goal: Walks with improved pain control throughout the shift  4/28/2025 1740 by Radha Marshall RN  Outcome: Progressing  4/28/2025 1739 by Radha Marshall RN  Outcome: Progressing  4/28/2025 1738 by Radha Marshall RN  Outcome: Progressing   The patient's goals for the shift include pain control    The clinical goals for the shift include pain control    Over the shift, the patient did not make progress toward the following goals. Barriers to progression include ***. Recommendations to address these barriers include ***.

## 2025-04-28 NOTE — ANESTHESIA PREPROCEDURE EVALUATION
Patient: Chely Prasad    Procedure Information       Anesthesia Start Date/Time: 25 1332    Procedure: ARTHROPLASTY, HIP, TOTAL, WITHOUT CEMENT (Left: Hip)    Location: [facility] OR 02 / [facility]    Surgeons: Fady Trevino MD            Relevant Problems   Cardiac   (+) Primary hypertension      Endocrine   (+) Hypothyroidism       Clinical information reviewed:   Tobacco  Allergies  Meds   Med Hx  Surg Hx  OB Status  Fam Hx  Soc   Hx        NPO Detail:  NPO/Void Status  Carbohydrate Drink Given Prior to Surgery? : N  Date of Last Liquid: 25  Time of Last Liquid: 1000  Date of Last Solid: 25  Time of Last Solid: 1800  Last Intake Type: Clear fluids  Time of Last Void: 1200         Physical Exam    Airway  Mallampati: II  TM distance: >3 FB  Mouth openin finger widths     Cardiovascular - normal exam  Rhythm: regular  Rate: normal     Dental - normal exam     Pulmonary - normal exam   Abdominal - normal examAbdomen: soft             Anesthesia Plan    History of general anesthesia?: yes  History of complications of general anesthesia?: no    ASA 2     general     The patient is not a current smoker.  Patient was previously instructed to abstain from smoking on day of procedure.  Patient smoked on day of procedure.  Education provided regarding risk of obstructive sleep apnea.  intravenous induction   Postoperative pain plan includes opioids.  Anesthetic plan and risks discussed with patient.  Use of blood products discussed with patient who.

## 2025-04-28 NOTE — ANESTHESIA PROCEDURE NOTES
Spinal Block    Patient location during procedure: OR  Start time: 4/28/2025 1:33 PM  End time: 4/28/2025 1:40 PM  Reason for block: primary anesthetic  Staffing  Performed: ADELA   Authorized by: Chad Loomis DO    Performed by: ADELA Riley    Preanesthetic Checklist  Completed: patient identified, IV checked, risks and benefits discussed, surgical consent, monitors and equipment checked, pre-op evaluation, timeout performed and sterile techniques followed  Block Timeout  RN/Licensed healthcare professional reads aloud to the Anesthesia provider and entire team: Patient identity, procedure with side and site, patient position, and as applicable the availability of implants/special equipment/special requirements.  Patient on coagulant treatment: no  Timeout performed at: 4/28/2025 1:32 PM  Spinal Block  Patient position: sitting  Prep: ChloraPrep  Sterility prep: cap, drape, gloves, hand hygiene and mask  Sedation level: general anesthesia  Patient monitoring: blood pressure, continuous pulse oximetry, EKG, ETCO2 and heart rate  Approach: midline  Vertebral space: L3-4  Injection technique: single-shot  Needle  Needle type: Quincke   Needle gauge: 25 G  Needle length: 3.5 in  Free flowing CSF: yes    Assessment  Sensory level: T4 bilateral  Block outcome: patient comfortable  Procedure assessment: patient sedated but conversant throughout procedure and patient tolerated procedure well with no immediate complications

## 2025-04-28 NOTE — ANESTHESIA POSTPROCEDURE EVALUATION
Patient: Chely Prasad    Procedure Summary       Date: 04/28/25 Room / Location: [facility] OR 02 / [facility]    Anesthesia Start: 1332 Anesthesia Stop: 1513    Procedure: ARTHROPLASTY, HIP, TOTAL, WITHOUT CEMENT (Left: Hip) Diagnosis: (M16.12 osteoarthritis left hip)    Surgeons: Fady Trevino MD Responsible Provider: Chad Loomis DO    Anesthesia Type: MAC, spinal ASA Status: 2            Anesthesia Type: MAC, spinal    Vitals Value Taken Time   /59 04/28/25 15:30   Temp 36 °C (96.8 °F) 04/28/25 15:12   Pulse 86 04/28/25 15:32   Resp 17 04/28/25 15:32   SpO2 100 % 04/28/25 15:32   Vitals shown include unfiled device data.    Anesthesia Post Evaluation    Patient location during evaluation: PACU  Patient participation: complete - patient participated  Level of consciousness: awake  Pain score: 0  Pain management: adequate  Multimodal analgesia pain management approach  Airway patency: patent  Two or more strategies used to mitigate risk of obstructive sleep apnea  Cardiovascular status: acceptable  Respiratory status: acceptable  Hydration status: acceptable  Postoperative Nausea and Vomiting: none        No notable events documented.

## 2025-04-29 ENCOUNTER — PHARMACY VISIT (OUTPATIENT)
Dept: PHARMACY | Facility: CLINIC | Age: 70
End: 2025-04-29
Payer: MEDICARE

## 2025-04-29 VITALS
OXYGEN SATURATION: 97 % | SYSTOLIC BLOOD PRESSURE: 114 MMHG | HEIGHT: 66 IN | RESPIRATION RATE: 16 BRPM | BODY MASS INDEX: 30.37 KG/M2 | DIASTOLIC BLOOD PRESSURE: 80 MMHG | HEART RATE: 97 BPM | WEIGHT: 189 LBS | TEMPERATURE: 99.7 F

## 2025-04-29 LAB
ANION GAP SERPL CALC-SCNC: 11 MMOL/L (ref 10–20)
BUN SERPL-MCNC: 13 MG/DL (ref 6–23)
CALCIUM SERPL-MCNC: 8.6 MG/DL (ref 8.6–10.3)
CHLORIDE SERPL-SCNC: 105 MMOL/L (ref 98–107)
CO2 SERPL-SCNC: 25 MMOL/L (ref 21–32)
CREAT SERPL-MCNC: 0.94 MG/DL (ref 0.5–1.05)
EGFRCR SERPLBLD CKD-EPI 2021: 66 ML/MIN/1.73M*2
ERYTHROCYTE [DISTWIDTH] IN BLOOD BY AUTOMATED COUNT: 12.9 % (ref 11.5–14.5)
GLUCOSE SERPL-MCNC: 115 MG/DL (ref 74–99)
HCT VFR BLD AUTO: 33.3 % (ref 36–46)
HGB BLD-MCNC: 11 G/DL (ref 12–16)
MCH RBC QN AUTO: 32.2 PG (ref 26–34)
MCHC RBC AUTO-ENTMCNC: 33 G/DL (ref 32–36)
MCV RBC AUTO: 97 FL (ref 80–100)
NRBC BLD-RTO: 0 /100 WBCS (ref 0–0)
PLATELET # BLD AUTO: 163 X10*3/UL (ref 150–450)
POTASSIUM SERPL-SCNC: 4.1 MMOL/L (ref 3.5–5.3)
RBC # BLD AUTO: 3.42 X10*6/UL (ref 4–5.2)
SODIUM SERPL-SCNC: 137 MMOL/L (ref 136–145)
WBC # BLD AUTO: 4.7 X10*3/UL (ref 4.4–11.3)

## 2025-04-29 PROCEDURE — 7100000011 HC EXTENDED STAY RECOVERY HOURLY - NURSING UNIT

## 2025-04-29 PROCEDURE — 2500000001 HC RX 250 WO HCPCS SELF ADMINISTERED DRUGS (ALT 637 FOR MEDICARE OP): Performed by: PHYSICIAN ASSISTANT

## 2025-04-29 PROCEDURE — 36415 COLL VENOUS BLD VENIPUNCTURE: CPT | Performed by: ORTHOPAEDIC SURGERY

## 2025-04-29 PROCEDURE — 2500000004 HC RX 250 GENERAL PHARMACY W/ HCPCS (ALT 636 FOR OP/ED): Performed by: ORTHOPAEDIC SURGERY

## 2025-04-29 PROCEDURE — 2500000001 HC RX 250 WO HCPCS SELF ADMINISTERED DRUGS (ALT 637 FOR MEDICARE OP): Performed by: ORTHOPAEDIC SURGERY

## 2025-04-29 PROCEDURE — 82374 ASSAY BLOOD CARBON DIOXIDE: CPT | Performed by: ORTHOPAEDIC SURGERY

## 2025-04-29 PROCEDURE — 85027 COMPLETE CBC AUTOMATED: CPT | Performed by: ORTHOPAEDIC SURGERY

## 2025-04-29 PROCEDURE — 97165 OT EVAL LOW COMPLEX 30 MIN: CPT | Mod: GO | Performed by: OCCUPATIONAL THERAPIST

## 2025-04-29 PROCEDURE — RXMED WILLOW AMBULATORY MEDICATION CHARGE

## 2025-04-29 PROCEDURE — 97161 PT EVAL LOW COMPLEX 20 MIN: CPT | Mod: GP

## 2025-04-29 PROCEDURE — 97110 THERAPEUTIC EXERCISES: CPT | Mod: GP,CQ

## 2025-04-29 PROCEDURE — 97116 GAIT TRAINING THERAPY: CPT | Mod: GP

## 2025-04-29 PROCEDURE — 2500000002 HC RX 250 W HCPCS SELF ADMINISTERED DRUGS (ALT 637 FOR MEDICARE OP, ALT 636 FOR OP/ED): Performed by: ORTHOPAEDIC SURGERY

## 2025-04-29 RX ORDER — POLYETHYLENE GLYCOL 3350 17 G/17G
17 POWDER, FOR SOLUTION ORAL DAILY
Qty: 7 PACKET | Refills: 0 | Status: SHIPPED | OUTPATIENT
Start: 2025-04-30 | End: 2025-05-07

## 2025-04-29 RX ORDER — ASPIRIN 81 MG/1
81 TABLET ORAL 2 TIMES DAILY
Qty: 60 TABLET | Refills: 0 | Status: SHIPPED | OUTPATIENT
Start: 2025-04-29 | End: 2025-05-29

## 2025-04-29 RX ORDER — OXYCODONE HYDROCHLORIDE 5 MG/1
5 TABLET ORAL EVERY 6 HOURS PRN
Qty: 28 TABLET | Refills: 0 | Status: SHIPPED | OUTPATIENT
Start: 2025-04-29 | End: 2025-05-06

## 2025-04-29 RX ORDER — ACETAMINOPHEN 325 MG/1
650 TABLET ORAL EVERY 6 HOURS PRN
Start: 2025-04-29 | End: 2025-05-29

## 2025-04-29 RX ADMIN — ACETAMINOPHEN 650 MG: 325 TABLET ORAL at 00:10

## 2025-04-29 RX ADMIN — OXYCODONE HYDROCHLORIDE 10 MG: 10 TABLET ORAL at 07:29

## 2025-04-29 RX ADMIN — OXYCODONE HYDROCHLORIDE 5 MG: 5 TABLET ORAL at 11:47

## 2025-04-29 RX ADMIN — ACETAMINOPHEN 650 MG: 325 TABLET ORAL at 11:47

## 2025-04-29 RX ADMIN — CEFAZOLIN SODIUM 2 G: 2 INJECTION, SOLUTION INTRAVENOUS at 05:05

## 2025-04-29 RX ADMIN — LISINOPRIL 10 MG: 10 TABLET ORAL at 09:07

## 2025-04-29 RX ADMIN — ASPIRIN 81 MG: 81 TABLET, COATED ORAL at 09:07

## 2025-04-29 RX ADMIN — LEVOTHYROXINE SODIUM 50 MCG: 0.05 TABLET ORAL at 05:05

## 2025-04-29 RX ADMIN — ACETAMINOPHEN 650 MG: 325 TABLET ORAL at 05:05

## 2025-04-29 ASSESSMENT — COGNITIVE AND FUNCTIONAL STATUS - GENERAL
MOVING TO AND FROM BED TO CHAIR: A LITTLE
WALKING IN HOSPITAL ROOM: A LITTLE
DAILY ACTIVITIY SCORE: 19
STANDING UP FROM CHAIR USING ARMS: A LITTLE
TURNING FROM BACK TO SIDE WHILE IN FLAT BAD: A LITTLE
MOVING TO AND FROM BED TO CHAIR: A LITTLE
CLIMB 3 TO 5 STEPS WITH RAILING: A LITTLE
MOBILITY SCORE: 17
MOVING FROM LYING ON BACK TO SITTING ON SIDE OF FLAT BED WITH BEDRAILS: A LITTLE
STANDING UP FROM CHAIR USING ARMS: A LITTLE
WALKING IN HOSPITAL ROOM: A LITTLE
TURNING FROM BACK TO SIDE WHILE IN FLAT BAD: A LITTLE
PERSONAL GROOMING: A LITTLE
CLIMB 3 TO 5 STEPS WITH RAILING: A LOT
MOVING FROM LYING ON BACK TO SITTING ON SIDE OF FLAT BED WITH BEDRAILS: A LITTLE
HELP NEEDED FOR BATHING: A LITTLE
DRESSING REGULAR LOWER BODY CLOTHING: A LITTLE
DRESSING REGULAR UPPER BODY CLOTHING: A LITTLE
MOBILITY SCORE: 18
TOILETING: A LITTLE

## 2025-04-29 ASSESSMENT — PAIN - FUNCTIONAL ASSESSMENT
PAIN_FUNCTIONAL_ASSESSMENT: 0-10

## 2025-04-29 ASSESSMENT — PAIN DESCRIPTION - LOCATION
LOCATION: HIP
LOCATION: HIP

## 2025-04-29 ASSESSMENT — PAIN SCALES - GENERAL
PAINLEVEL_OUTOF10: 3
PAINLEVEL_OUTOF10: 4
PAINLEVEL_OUTOF10: 7
PAINLEVEL_OUTOF10: 5 - MODERATE PAIN
PAINLEVEL_OUTOF10: 4

## 2025-04-29 ASSESSMENT — PAIN DESCRIPTION - ORIENTATION
ORIENTATION: LEFT
ORIENTATION: LEFT

## 2025-04-29 ASSESSMENT — ACTIVITIES OF DAILY LIVING (ADL): ADL_ASSISTANCE: INDEPENDENT

## 2025-04-29 NOTE — CARE PLAN
Report from GABE Gallegos. Patient resting in bed, no needs at this time. Call light in reach and bed alarm on.

## 2025-04-29 NOTE — NURSING NOTE
Discharge teaching completed, voiced understanding.  Reviewed hip precautions with pt and son.  Taken via w/c to private vehicle.  Vitals stable, see flow sheet.

## 2025-04-29 NOTE — PROGRESS NOTES
Physical Therapy    Physical Therapy    Physical Therapy Treatment    Patient Name: Chely Prasad  MRN: [id number]  Today's Date: 4/29/2025  Time Calculation  Start Time: 1222  Stop Time: 1300  Time Calculation (min): 38 min     4123/4123-A    Assessment/Plan   PT Assessment  PT Assessment Results: Decreased strength, Decreased range of motion, Decreased endurance, Impaired balance, Decreased mobility, Decreased safety awareness, Pain  Rehab Prognosis: Good  End of Session Communication: Bedside nurse  End of Session Patient Position: Up in chair, Alarm on  PT Plan  Inpatient/Swing Bed or Outpatient: Inpatient  PT Plan  Treatment/Interventions: Bed mobility, Transfer training, Gait training, Balance training, Neuromuscular re-education, Strengthening, Endurance training, Range of motion, Therapeutic exercise, Therapeutic activity, Home exercise program  PT Frequency: BID  PT Discharge Recommendations: Low intensity level of continued care  PT Recommended Transfer Status: Assist x1  PT - OK to Discharge: Yes ()     04/29/25 1222   PT  Visit   PT Received On 04/29/25   General   Reason for Referral BRUNO   Referred By Fady Trevino MD   Past Medical History Relevant to Rehab Pt admitted 4/28/25 following completion of posterior BRUNO. PMH: HTN, hypothyroidism   Family/Caregiver Present Yes  (son)   Prior to Session Communication Bedside nurse   Patient Position Received Alarm on   General Comment Pt agreeable to PT, nursing cleared for treatment.   Precautions   LE Weight Bearing Status Weight Bearing as Tolerated   Medical Precautions Fall precautions   Post-Surgical Precautions Left total knee precautions   Pain Assessment   Pain Assessment 0-10   0-10 (Numeric) Pain Score 3   Pain Location Hip   Pain Orientation Left   Cognition   Overall Cognitive Status WFL   Orientation Level Oriented X4   Therapeutic Exercise   Therapeutic Exercise Performed Yes   Therapeutic Exercise Activity 1 AP,  QS,  GS,  Heelslides,  LAQ,   Ball squeezes,  Hip Abduction x 20  each B  with emphasis on  L LE   Ambulation/Gait Training   Ambulation/Gait Training Performed Yes   Ambulation/Gait Training 1   Surface 1 Level tile   Device 1 Rolling walker   Gait Support Devices Gait belt   Assistance 1 Contact guard  (verbal cues for foot placement mainy step to pattern)   Comments/Distance (ft) 1 100 x 2  (Mainly step to pattern needs cus for erect psoture)   Transfers   Transfer Yes   Transfer 1   Transfer to 1 Sit;Stand   Transfer Device 1 Walker;Gait belt   Transfer Level of Assistance 1 Contact guard   PT Assessment   PT Assessment Results Decreased strength;Decreased range of motion;Decreased endurance;Impaired balance;Decreased mobility;Decreased safety awareness;Pain   Rehab Prognosis Good   End of Session Communication Bedside nurse   End of Session Patient Position Up in chair;Alarm on   PT Plan   Inpatient/Swing Bed or Outpatient Inpatient     Outcome Measures:  [facility] Basic Mobility  Turning from your back to your side while in a flat bed without using bedrails: A little  Moving from lying on your back to sitting on the side of a flat bed without using bedrails: A little  Moving to and from bed to chair (including a wheelchair): A little  Standing up from a chair using your arms (e.g. wheelchair or bedside chair): A little  To walk in hospital room: A little  Climbing 3-5 steps with railing: A little  Basic Mobility - Total Score: 18                             EDUCATION:     Education Documentation  No documentation found.  Education Comments  No comments found.        GOALS:  Encounter Problems       Encounter Problems (Active)       Pain - Adult

## 2025-04-29 NOTE — PROGRESS NOTES
04/29/25 1040   Discharge Planning   Living Arrangements Spouse/significant other   Support Systems Spouse/significant other   Type of Residence Private residence   Home or Post Acute Services In home services   Type of Home Care Services Home PT   Expected Discharge Disposition Home   Patient Choice   Patient / Family choosing to utilize agency / facility established prior to hospitalization Yes     Met with patient at bedside. Admitted for L BRUNO. Pt lives with spouse and was independent PTA with no HHC. Pt has a walker. PCP is Bhupinder Mcfarland. Pt feels she is able to manage her health and understands her medications. Was able to drive prior to surgery and obtain meds. No financial concerns. Therapy evals pending. Pt plans to return home with [facility]. Pt has number to call [facility] when she arrives home. Family will provide transport. Pt states she needs UB-04 form. Message sent to Vitaliy Louise to ask about obtaining this form.

## 2025-04-29 NOTE — CARE PLAN
The patient's goals for the shift include pain control    The clinical goals for the shift include Pt will remain safe throughout the shift.      Pt was safely discharged to home at this time.

## 2025-04-29 NOTE — DISCHARGE SUMMARY
Discharge Diagnosis  Osteoarthritis of left hip, unspecified osteoarthritis type    Issues Requiring Follow-Up  Left hip    Test Results Pending At Discharge  Pending Labs       No current pending labs.            Hospital Course   Patient underwent left total hip arthroplasty on 4/28/2025.  She had an uneventful postoperative course which included working with therapy, using incentive spirometry, tolerating a diet and voiding well.  She was started on aspirin 81 mg p.o. twice daily for VTE prophylaxis and instructed to continue for 30 days.  She was given a prescription for narcotic pain medication and instructed its appropriate use.  Patient was discharged to home in satisfactory condition with home health care.    Pertinent Physical Exam At Time of Discharge  Physical Exam    Home Medications     Medication List      START taking these medications     acetaminophen 325 mg tablet; Commonly known as: Tylenol; Take 2 tablets   (650 mg) by mouth every 6 hours if needed for mild pain (1 - 3).   aspirin 81 mg EC tablet; Take 1 tablet (81 mg) by mouth 2 times a day.   oxyCODONE 5 mg immediate release tablet; Commonly known as: Roxicodone;   Take 1 tablet (5 mg) by mouth every 6 hours if needed for severe pain (7 -   10) for up to 7 days.   polyethylene glycol 17 gram packet; Commonly known as: Glycolax,   Miralax; Mix 1 packet (17 g) with 4 to 6 ounces of beverage and take by   mouth once daily for 7 days.; Start taking on: April 30, 2025     CONTINUE taking these medications     cyanocobalamin 1,000 mcg tablet; Commonly known as: Vitamin B-12   ferrous sulfate 325 (65 Fe) mg EC tablet   levothyroxine 50 mcg tablet; Commonly known as: Synthroid, Levoxyl   lisinopril 10 mg tablet     STOP taking these medications     chlorhexidine 0.12 % solution; Commonly known as: Peridex       Outpatient Follow-Up  No future appointments.    Angela Gautam PA-C

## 2025-04-29 NOTE — PROGRESS NOTES
Physical Therapy    Physical Therapy Evaluation and Treatment    Patient Name: Chely Prasad  MRN: [id number]  Today's Date: 4/29/2025   Time Calculation  Start Time: 0928  Stop Time: 0954  Time Calculation (min): 26 min  4123/4123-A    Assessment/Plan   PT Assessment  PT Assessment Results: Decreased strength, Decreased range of motion, Decreased endurance, Impaired balance, Decreased mobility, Decreased safety awareness, Pain  Rehab Prognosis: Good  Evaluation/Treatment Tolerance: Patient tolerated treatment well  Medical Staff Made Aware: Yes  End of Session Communication: Bedside nurse  Assessment Comment: Pt presents today below baseline level of function and requires continued PT during hospital stay. Pt requires PT at a low intensity level at discharge to maximize functional mobility and safety.    End of Session Patient Position: Up in chair, Alarm on  IP OR SWING BED PT PLAN  Inpatient or Swing Bed: Inpatient  PT Plan  Treatment/Interventions: Bed mobility, Transfer training, Gait training, Balance training, Neuromuscular re-education, Strengthening, Endurance training, Range of motion, Therapeutic exercise, Therapeutic activity, Home exercise program, Stair training  PT Plan: Ongoing PT  PT Frequency: BID  PT Discharge Recommendations: Low intensity level of continued care  Equipment Recommended upon Discharge: Wheeled walker, Straight cane  PT Recommended Transfer Status: Assist x1  PT - OK to Discharge: Yes (To next level of care when cleared by medical team   )    Subjective     General Visit Information:  General  Reason for Referral: BRUNO  Referred By: Fady Trevino MD  Past Medical History Relevant to Rehab: Pt admitted 4/28/25 following completion of left posterior BRUNO. PMH: HTN, hypothyroidism  Family/Caregiver Present: No  Prior to Session Communication: Bedside nurse  Patient Position Received: Up in chair, Alarm on  Preferred Learning Style: verbal  General Comment: Pt agreeable to PT, nursing  cleared for treatment.    Home Living:  Home Living  Type of Home: House  Lives With: Spouse  Home Adaptive Equipment: Walker rolling or standard, Cane  Home Layout: Stairs to alternate level with rails, Bed/bath upstairs (split level)  Alternate Level Stairs-Number of Steps: 5 stairs down to lower level and 10 stairs with railing to second floor bathroom  Home Access: Stairs to enter without rails  Entrance Stairs-Number of Steps: one platform step  Bathroom Shower/Tub: Walk-in shower (on lower level)  Home Living Comments: son will stay with pt for a week upon discharge    Prior Level of Function:  Prior Function Per Pt/Caregiver Report  ADL Assistance: Independent  Homemaking Assistance: Independent  Ambulatory Assistance: Independent    Precautions:  Precautions  LE Weight Bearing Status: Weight Bearing as Tolerated  Medical Precautions: Fall precautions  Post-Surgical Precautions: Left total knee precautions       Objective     Pain:  Pain Assessment  Pain Assessment: 0-10  0-10 (Numeric) Pain Score:  (2/10 with rest and 5/10 with activity)  Pain Type: Surgical pain  Pain Location: Hip  Pain Orientation: Left  Pain Interventions: Ambulation/increased activity, Repositioned, Cold applied    Cognition:  Cognition  Overall Cognitive Status: Within Functional Limits  Orientation Level: Oriented X4    General Assessments:      Activity Tolerance  Endurance: Endurance does not limit participation in activity  Sensation  Sensation Comment: denies numbness and tingling              Static Sitting Balance  Static Sitting-Comment/Number of Minutes: good  Dynamic Sitting Balance  Dynamic Sitting-Comments: good  Static Standing Balance  Static Standing-Comment/Number of Minutes: fair  Dynamic Standing Balance  Dynamic Standing-Comments: fair    Functional Assessments:     Bed Mobility  Bed Mobility: No  Transfers  Transfer: Yes  Transfer 1  Transfer From 1: Sit to  Transfer to 1: Stand  Transfer Device 1: Walker  Transfer  Level of Assistance 1: Contact guard  Transfers 2  Transfer From 2: Stand to  Transfer to 2: Sit  Transfer Level of Assistance 2: Contact guard, Minimal verbal cues  Trials/Comments 2: poor eecentric control  Ambulation/Gait Training  Ambulation/Gait Training Performed: Yes  Ambulation/Gait Training 1  Device 1: Rolling walker  Gait Support Devices: Gait belt  Assistance 1: Contact guard, Minimal verbal cues  Quality of Gait 1: Inconsistent stride length, Decreased step length, Soft knee(s) (step to gait, increased knee flexion throughout gait cycle)  Comments/Distance (ft) 1: 60 feet x 2        Treatment    Pt educated on posterior hip precautions. Cues for safe hand placement with use of FWW for sit<>stand. Instruction provided in step to gait pattern with cues given for sequencing with FWW. Cues given for turning strategy to avoid pivoting on left LE. Instruction provided in ankle pumps, quad sets, glute sets in order to maximize strength and circulation. Cues given for proper technique and parameters.         Extremity/Trunk Assessments:        Strength RLE  RLE Overall Strength: Greater than or equal to 3/5 as evidenced by functional mobility  Strength LLE  LLE Overall Strength: Greater than or equal to 3/5 as evidenced by functional mobility    Outcome Measures:     [facility] Basic Mobility  Turning from your back to your side while in a flat bed without using bedrails: A little  Moving from lying on your back to sitting on the side of a flat bed without using bedrails: A little  Moving to and from bed to chair (including a wheelchair): A little  Standing up from a chair using your arms (e.g. wheelchair or bedside chair): A little  To walk in hospital room: A little  Climbing 3-5 steps with railing: A lot  Basic Mobility - Total Score: 17           Goals:  Encounter Problems       Encounter Problems (Active)       PT Problem       Pt will perform bed mobility with mod I.  (Progressing)       Start:  04/29/25     Expected End:  05/13/25            Pt will complete sit <> stand and bed <> chair transfers with mod I.  (Progressing)       Start:  04/29/25    Expected End:  05/13/25            Pt will ambulate 300 feet mod I using FWW with no significant gait deviations.   (Progressing)       Start:  04/29/25    Expected End:  05/13/25            Pt will verbalize and demonstrate understanding of BRUNO supine/seated exercises.  (Progressing)       Start:  04/29/25    Expected End:  05/13/25            Pt will ascend/descend at least 10 stairs using rail and cane SBA in order to navigate home environment.   (Progressing)       Start:  04/29/25    Expected End:  05/13/25                     Education Documentation  Precautions, taught by Yaneth Garg, PT at 4/29/2025  2:36 PM.  Learner: Patient  Readiness: Acceptance  Method: Explanation  Response: Verbalizes Understanding, Needs Reinforcement    Body Mechanics, taught by Yaneth Garg, PT at 4/29/2025  2:36 PM.  Learner: Patient  Readiness: Acceptance  Method: Explanation  Response: Verbalizes Understanding, Needs Reinforcement    Home Exercise Program, taught by Yaneth Garg, PT at 4/29/2025  2:36 PM.  Learner: Patient  Readiness: Acceptance  Method: Explanation  Response: Verbalizes Understanding, Needs Reinforcement    Mobility Training, taught by Yaneth Garg, PT at 4/29/2025  2:36 PM.  Learner: Patient  Readiness: Acceptance  Method: Explanation  Response: Verbalizes Understanding, Needs Reinforcement    Education Comments  No comments found.

## 2025-04-29 NOTE — PROGRESS NOTES
Occupational Therapy    Evaluation    Patient Name: Chely Prasad  MRN: [id number]  Today's Date: 4/29/2025  Time Calculation  Start Time: 0955  Stop Time: 1015  Time Calculation (min): 20 min  4123/4123-A  Eval only     Assessment  IP OT Assessment  Prognosis: Good  End of Session Communication: Bedside nurse  End of Session Patient Position: Up in chair, Alarm on  Patient presents with decline in ADLs, functional transfers, functional mobility and would benefit from OT during acute stay to improve functional independence and safety. Recommend low intensity OT to maximize functional independence and safety.     Plan:  Treatment Interventions: ADL retraining, Functional transfer training, Patient/family training, Equipment evaluation/education, Compensatory technique education  OT Frequency: Daily  OT Discharge Recommendations: Low intensity level of continued care  Equipment Recommended upon Discharge: Wheeled walker (shower chair, reacher, sock aide, LH shoe horn, LH sponge)  OT - OK to Discharge: Yes from acute care OT services to the next level of care when cleared by medical team      Subjective   Current Problem:  1. Status post hip replacement, left  acetaminophen (Tylenol) 325 mg tablet    aspirin 81 mg EC tablet    polyethylene glycol (Glycolax, Miralax) 17 gram packet    oxyCODONE (Roxicodone) 5 mg immediate release tablet          General:  General  Reason for Referral: BRUNO  Referred By: Fady Trevino MD  Past Medical History Relevant to Rehab: Admitted 4/28/2025 after having left BRUNO completed by Dr Trevino. PMH: HTN, CKD III, HLD, meniscectomy  Prior to Session Communication: Bedside nurse  Patient Position Received: Up in chair, Alarm on  Preferred Learning Style: verbal  General Comment: Patient seated in bedside chair and agreeable to participate in OT evaluation.    Precautions:  LE Weight Bearing Status: Weight Bearing as Tolerated  Medical Precautions: Fall precautions  Post-Surgical Precautions:  Left hip precautions    Pain:  Pain Assessment  Pain Assessment: 0-10  0-10 (Numeric) Pain Score: 5 - Moderate pain  Pain Type: Surgical pain  Pain Location: Hip  Pain Orientation: Left  Pain Interventions: Rest    Objective   Cognition:  Overall Cognitive Status: Within Functional Limits  Orientation Level: Oriented X4    Home Living:  Type of Home: House  Lives With: Spouse  Home Layout: Multi-level (has 5 steps with railing down to lower level and 10 steps with railing to 2nd floor bedroom)  Home Access: Stairs to enter without rails  Entrance Stairs-Number of Steps: 1  Bathroom Shower/Tub: Walk-in shower (located on lower level)     Prior Function:  Prior Function Comments: Was independent with all ADLs, IADLs and functional mobility tasks without AD    ADL:  UE Dressing Assistance: Independent (don bra and dress)  LE Dressing Assistance: Minimal. Educated patient on use of sock aide.  Min assist provided to don sock with sock aide.    ADL Comments: Educated patient on hip precautions, safety, and use of AE for LB dressing. patient verbalized understanding. Reports owning reacher and sock aide    Activity Tolerance:  Endurance: Endurance does not limit participation in activity    Bed Mobility/Transfers:   Transfers  Transfer:  (CGA sit <> stand with min verbal cues for safety and technique)  Educated patient on safety with car transfers and patient verbalized understanding.  Provided handout    Ambulation/Gait Training:  Functional Mobility  Functional Mobility Performed:  (CGA with WW functional mobility task in room)    Extremities: RUE   RUE : Within Functional Limits and LUE   LUE: Within Functional Limits    Outcome Measures: [facility] Daily Activity  Putting on and taking off regular lower body clothing: A little  Bathing (including washing, rinsing, drying): A little  Putting on and taking off regular upper body clothing: A little  Toileting, which includes using toilet, bedpan or urinal: A little  Taking  care of personal grooming such as brushing teeth: A little  Eating Meals: None  Daily Activity - Total Score: 19    EDUCATION:  Education  Individual(s) Educated: Patient  Education Provided: Fall precautons (safety, comp strategies with LB dressing, hip precautions)  Patient Response to Education: Patient/Caregiver Verbalized Understanding of Information    Goals:   Encounter Problems       Encounter Problems (Active)       Dressings Lower Extremities       STG - Patient will complete lower body dressing independently with AE and comp strategies  (Progressing)       Start:  04/29/25    Expected End:  05/13/25               Functional Balance       STG-Patient will be independent with assistive device dynamic stand task >5 minutes for ADL completion   (Progressing)       Start:  04/29/25    Expected End:  05/13/25               Functional Mobility       STG-Patient will be independent with assistive device functional mobility tasks   (Progressing)       Start:  04/29/25    Expected End:  05/13/25               OT Transfers       STG - Patient will perform car transfers independently demonstrating good safety  (Progressing)       Start:  04/29/25    Expected End:  05/13/25            STG-Patient will be independent with functional transfers demonstrating good safety   (Progressing)       Start:  04/29/25    Expected End:  05/13/25               Safety       STG-Patient will independently verbalize hip precautions with all functional tasks   (Progressing)       Start:  04/29/25    Expected End:  05/13/25

## 2025-04-29 NOTE — PROGRESS NOTES
"Chely Prasad is a 69 y.o. female on day 0 of admission presenting with Osteoarthritis of left hip, unspecified osteoarthritis type.    Subjective   Sitting up in chair upon entering room.  Does report moderate amount of pain that improves with p.o. pain medication.  No complaints of chest pain, shortness of breath, lightheaded or dizziness.  Tolerating a diet with no nausea or vomiting.  Voiding well.  Plan is for discharge to home with home health care.       Objective     Physical Exam  Constitutional:       Appearance: Normal appearance.   HENT:      Head: Normocephalic and atraumatic.      Nose: Nose normal.      Mouth/Throat:      Mouth: Mucous membranes are moist.   Cardiovascular:      Rate and Rhythm: Normal rate.   Pulmonary:      Effort: Pulmonary effort is normal.   Abdominal:      General: Abdomen is flat.      Palpations: Abdomen is soft.   Musculoskeletal:      Cervical back: Neck supple.      Comments: Left lateral hip thigh area with surgical dressing clean dry and intact, sensation present throughout left lower extremity, plantar and dorsiflexion of left foot against resistance, DP pulse palpable   Skin:     General: Skin is warm and dry.   Neurological:      General: No focal deficit present.      Mental Status: She is alert.   Psychiatric:         Mood and Affect: Mood normal.         Last Recorded Vitals  Blood pressure 115/85, pulse 77, temperature 36.7 °C (98.1 °F), temperature source Temporal, resp. rate 16, height 1.676 m (5' 6\"), weight 85.7 kg (189 lb), SpO2 98%.  Intake/Output last 3 Shifts:  I/O last 3 completed shifts:  In: 1046.7 (12.2 mL/kg) [I.V.:846.7 (9.9 mL/kg); IV Piggyback:200]  Out: - (0 mL/kg)   Weight: 85.7 kg     Relevant Results  Results for orders placed or performed during the hospital encounter of 04/28/25 (from the past 24 hours)   CBC   Result Value Ref Range    WBC 4.7 4.4 - 11.3 x10*3/uL    nRBC 0.0 0.0 - 0.0 /100 WBCs    RBC 3.42 (L) 4.00 - 5.20 x10*6/uL    " Hemoglobin 11.0 (L) 12.0 - 16.0 g/dL    Hematocrit 33.3 (L) 36.0 - 46.0 %    MCV 97 80 - 100 fL    MCH 32.2 26.0 - 34.0 pg    MCHC 33.0 32.0 - 36.0 g/dL    RDW 12.9 11.5 - 14.5 %    Platelets 163 150 - 450 x10*3/uL   Basic metabolic panel   Result Value Ref Range    Glucose 115 (H) 74 - 99 mg/dL    Sodium 137 136 - 145 mmol/L    Potassium 4.1 3.5 - 5.3 mmol/L    Chloride 105 98 - 107 mmol/L    Bicarbonate 25 21 - 32 mmol/L    Anion Gap 11 10 - 20 mmol/L    Urea Nitrogen 13 6 - 23 mg/dL    Creatinine 0.94 0.50 - 1.05 mg/dL    eGFR 66 >60 mL/min/1.73m*2    Calcium 8.6 8.6 - 10.3 mg/dL     Scheduled medications  Scheduled Medications[1]  Continuous medications  Continuous Medications[2]  PRN medications  PRN Medications[3]    Assessment/Plan   Assessment & Plan  Osteoarthritis of left hip, unspecified osteoarthritis type    Primary hypertension    Hypothyroidism    Postop day 1 of left total hip arthroplasty  Physical and Occupational Therapy are on consult  Weightbearing as tolerated with walker  ASA for VTE prophylaxis  Labs reviewed  Multimodal pain regime  Home medications ordered for chronic medical conditions as appropriate  Bowel regime  Encourage incentive spirometry  Plans on discharge to home with home health care  Follow-up with Dr. Trevino as directed       I spent 40 minutes in the professional and overall care of this patient.      LEIGH NavaC           [1] acetaminophen, 650 mg, oral, q6h  aspirin, 81 mg, oral, BID  levothyroxine, 50 mcg, oral, Daily  lisinopril, 10 mg, oral, Daily  polyethylene glycol, 17 g, oral, Daily    [2] lactated Ringer's, 50 mL/hr, Last Rate: Stopped (04/29/25 0542)  lactated Ringer's, 50 mL/hr, Last Rate: Stopped (04/29/25 0542)  oxygen, 2 L/min, Last Rate: Stopped (04/28/25 1730)    [3] PRN medications: HYDROmorphone, naloxone, ondansetron **OR** ondansetron, oxyCODONE, oxyCODONE

## 2025-04-29 NOTE — DISCHARGE INSTRUCTIONS
.Dr. Trevino Hip replacement  *Call Dr. Trevino for any problems and/or concerns.  *Maintain hip precautions  *You may shower, do not soak or scrub incision; pat dry  *Apply ice to hip as needed to minimize swelling, on 20 minutes, off 20 minutes  *Continue the coughing and deep breathing exercises that you learned in the hospital  *Move around as much as you can tolerate because movement can help you heal and helps prevent stiffness, skin sores, and blood clots    Call Doctor right away for:  *Increased hip pain  *Pain or swelling in your calf of leg  *Fever above 100.4/shaking chills  *Excessive swelling, increased redness or drainage around the incision  *Your incision breaks open  *Chest pain/shortness of breath, call 911    After the procedure it is common to have pain and swelling.      -To help prevent hip dislocation, follow instructions about movement restrictions as told by your physician:  *Do not cross your legs at the knees. To remind yourself about this, you may keep a pillow between your legs while lying in bed  *Do not bend at the hip and waist or bend farther than 90 degrees of flexion.   To avoid bending this far: do not bring your knees higher than your hips, do not  something from the floor while sitting in a chair, avoid sitting in low chairs, used raised toilet seat (if needed), when standing up from a seated position- keep injured leg out in front of you  *Avoid twisting at your waist and reaching across your body to the side of the affected leg  *Avoid rotating your toes inward on the affected leg    *When getting into a car:  1. Raise the seat as high as possible, move the seat as far back as it will go, and recline the upper part of the seat slightly  2. Sit down on the seat with your injured leg extended out of the car  3. Scoot back in the seat as you move the lower half of your body into the car. Try to avoid bumping your foot or leg as you bring it into the car. To make this motion  smooth and easy on a cloth seat, try placing a plastic bag on the seat    *If your physician has prescribed compression stockings please use as directed. These stockings help to prevent blood clots and reduce swelling in your legs  *Continue to do the exercises you learned in the hospital: i.e. ankle pumps  *If you were prescribed a blood thinner (anticoagulant), take it as prescribed    *Do not use any products that contain nicotine or tobacco, such as cigarettes and e-cigarettes. These can delay bone healing. If you need help quitting, ask your healthcare provider    If you are taking prescription pain medication, take actions to prevent or treat constipation.  -drink enough fluids to keep your urine pale yellow  -eat foods that are high in fiber, such as fresh fruits and vegetables, whole grains, and beans  -limit food that are high in fat and processed sugars, such as fried or sweet foods  -take an over the counter or prescribed medicine for constipation

## 2025-08-13 LAB
ATRIAL RATE: 76 BPM
P AXIS: 63 DEGREES
P OFFSET: 190 MS
P ONSET: 139 MS
PR INTERVAL: 146 MS
Q ONSET: 212 MS
QRS COUNT: 13 BEATS
QRS DURATION: 84 MS
QT INTERVAL: 392 MS
QTC CALCULATION(BAZETT): 441 MS
QTC FREDERICIA: 424 MS
R AXIS: 49 DEGREES
T AXIS: 51 DEGREES
T OFFSET: 408 MS
VENTRICULAR RATE: 76 BPM

## (undated) DEVICE — APPLICATOR, CHLORAPREP, W/ORANGE TINT, 26ML

## (undated) DEVICE — ELECTRODE, ELECTROSURGICAL, BLADE, EXTENDED

## (undated) DEVICE — DRAPE, SHEET, THREE QUARTER, FAN FOLD, 57 X 77 IN

## (undated) DEVICE — GLOVE, SURGICAL, PROTEXIS PI W/NEU-THERA, 8.0, PF, LF

## (undated) DEVICE — GLOVE, SURGICAL, PROTEXIS PI BLUE W/NEUTHERA, 8.0, PF, LF

## (undated) DEVICE — PHOTONSABER, W/YANKAUER, TAPER TIP

## (undated) DEVICE — DRESSING, MEPILEX BORDER, POST-OP AG, 4 X 12 IN

## (undated) DEVICE — DRAPE, TAPE, ORTHO

## (undated) DEVICE — SUTURE, STRATAFIX, SPIRAL MONOCRYL PLUS, 3-0, PS-2 45CM, UNDYED

## (undated) DEVICE — GOWN, ISOLATION, IMPERVIOUS, XLARGE, LF, BLUE

## (undated) DEVICE — HOOD, SURGICAL, FLYTE SURGICOOL

## (undated) DEVICE — DRAPE, INSTRUMENT, W/POUCH, STERI DRAPE, 7 X 11 IN, DISPOSABLE, STERILE

## (undated) DEVICE — SUTURE, VICRYL PLUS, 1, 8X27IN, CT-1, CR, UND, BRAIDED

## (undated) DEVICE — BLADE, SAW, SAGITTAL, 21 X 84.5 X 0.89 MM, STAINLESS STEEL, STERILE

## (undated) DEVICE — Device

## (undated) DEVICE — COVER HANDLE LIGHT, STERIS, BLUE, STERILE

## (undated) DEVICE — GLOVE, SURGICAL, PROTEXIS PI , 7.5, PF, LF

## (undated) DEVICE — SOLUTION, IRRIGATION, SODIUM CHLORIDE 0.9%, 1000 ML, POUR BOTTLE

## (undated) DEVICE — SUTURE, STARTAFIX, SYMMETRIC, PDS PLUS, 60CM CT-1

## (undated) DEVICE — SUTURE, VICRYL, 2-0, 36 IN, CT-1, UNDYED

## (undated) DEVICE — CLOSURE SYSTEM, DERMABOND, PRINEO, 22CM, STERILE

## (undated) DEVICE — BANDAGE, COFLEX, 6 X 5 YDS, FOAM TAN, STERILE, LF

## (undated) DEVICE — TOWEL PACK, STERILE, 4/PACK, BLUE

## (undated) DEVICE — TIP, SUCTION, YANKAUER, FLEXIBLE

## (undated) DEVICE — SOLUTION, IRRIGATION, STERILE WATER, 1000 ML, POUR BOTTLE